# Patient Record
Sex: FEMALE | Race: WHITE | Employment: UNEMPLOYED | URBAN - METROPOLITAN AREA
[De-identification: names, ages, dates, MRNs, and addresses within clinical notes are randomized per-mention and may not be internally consistent; named-entity substitution may affect disease eponyms.]

---

## 2017-09-29 ENCOUNTER — HOSPITAL ENCOUNTER (OUTPATIENT)
Dept: GENERAL RADIOLOGY | Facility: HOSPITAL | Age: 37
Discharge: HOME | End: 2017-09-29
Attending: ORTHOPAEDIC SURGERY
Payer: COMMERCIAL

## 2017-09-29 DIAGNOSIS — M25.551 RIGHT HIP PAIN: ICD-10-CM

## 2017-09-29 DIAGNOSIS — Z98.890 HISTORY OF ORTHOPEDIC SURGERY: ICD-10-CM

## 2017-09-29 PROCEDURE — 73502 X-RAY EXAM HIP UNI 2-3 VIEWS: CPT | Performed by: ORTHOPAEDIC SURGERY

## 2018-08-16 PROBLEM — O34.40 HX OF LEEP (LOOP ELECTROSURGICAL EXCISION PROCEDURE) OF CERVIX COMPLICATING PREGNANCY (HCC): Status: ACTIVE | Noted: 2018-08-16

## 2018-08-16 PROBLEM — O09.811 PREGNANCY RESULTING FROM ASSISTED REPRODUCTIVE TECHNOLOGY IN FIRST TRIMESTER (HCC): Status: ACTIVE | Noted: 2018-08-16

## 2018-08-16 PROBLEM — Z98.890 HX OF LEEP (LOOP ELECTROSURGICAL EXCISION PROCEDURE) OF CERVIX COMPLICATING PREGNANCY: Status: ACTIVE | Noted: 2018-08-16

## 2018-08-16 PROBLEM — O09.519 AMA (ADVANCED MATERNAL AGE) PRIMIGRAVIDA 35+: Status: ACTIVE | Noted: 2018-08-16

## 2018-08-16 PROBLEM — Z98.890 HX OF LEEP (LOOP ELECTROSURGICAL EXCISION PROCEDURE) OF CERVIX COMPLICATING PREGNANCY (HCC): Status: ACTIVE | Noted: 2018-08-16

## 2018-08-16 PROBLEM — O34.40 HX OF LEEP (LOOP ELECTROSURGICAL EXCISION PROCEDURE) OF CERVIX COMPLICATING PREGNANCY: Status: ACTIVE | Noted: 2018-08-16

## 2018-08-16 PROBLEM — O09.519 AMA (ADVANCED MATERNAL AGE) PRIMIGRAVIDA 35+ (HCC): Status: ACTIVE | Noted: 2018-08-16

## 2018-08-16 PROBLEM — O09.811 PREGNANCY RESULTING FROM ASSISTED REPRODUCTIVE TECHNOLOGY IN FIRST TRIMESTER: Status: ACTIVE | Noted: 2018-08-16

## 2018-08-16 PROCEDURE — 86762 RUBELLA ANTIBODY: CPT | Performed by: OBSTETRICS & GYNECOLOGY

## 2018-08-16 PROCEDURE — 86850 RBC ANTIBODY SCREEN: CPT | Performed by: OBSTETRICS & GYNECOLOGY

## 2018-08-16 PROCEDURE — 87389 HIV-1 AG W/HIV-1&-2 AB AG IA: CPT | Performed by: OBSTETRICS & GYNECOLOGY

## 2018-08-16 PROCEDURE — 87086 URINE CULTURE/COLONY COUNT: CPT | Performed by: OBSTETRICS & GYNECOLOGY

## 2018-08-16 PROCEDURE — 87340 HEPATITIS B SURFACE AG IA: CPT | Performed by: OBSTETRICS & GYNECOLOGY

## 2018-08-16 PROCEDURE — 86901 BLOOD TYPING SEROLOGIC RH(D): CPT | Performed by: OBSTETRICS & GYNECOLOGY

## 2018-08-16 PROCEDURE — 86780 TREPONEMA PALLIDUM: CPT | Performed by: OBSTETRICS & GYNECOLOGY

## 2018-08-16 PROCEDURE — 86900 BLOOD TYPING SEROLOGIC ABO: CPT | Performed by: OBSTETRICS & GYNECOLOGY

## 2018-09-13 PROCEDURE — 88175 CYTOPATH C/V AUTO FLUID REDO: CPT | Performed by: OBSTETRICS & GYNECOLOGY

## 2018-10-11 PROCEDURE — 82105 ALPHA-FETOPROTEIN SERUM: CPT | Performed by: OBSTETRICS & GYNECOLOGY

## 2018-10-11 PROCEDURE — 36415 COLL VENOUS BLD VENIPUNCTURE: CPT | Performed by: OBSTETRICS & GYNECOLOGY

## 2018-12-05 PROBLEM — O09.519 SUPERVISION OF HIGH-RISK PREGNANCY OF ELDERLY PRIMIGRAVIDA: Status: ACTIVE | Noted: 2018-12-05

## 2018-12-05 PROBLEM — O09.519 SUPERVISION OF HIGH-RISK PREGNANCY OF ELDERLY PRIMIGRAVIDA (HCC): Status: ACTIVE | Noted: 2018-12-05

## 2018-12-18 PROCEDURE — 86780 TREPONEMA PALLIDUM: CPT | Performed by: OBSTETRICS & GYNECOLOGY

## 2018-12-18 PROCEDURE — 87389 HIV-1 AG W/HIV-1&-2 AB AG IA: CPT | Performed by: OBSTETRICS & GYNECOLOGY

## 2019-02-04 PROBLEM — O09.819 PREGNANCY RESULTING FROM ASSISTED REPRODUCTIVE TECHNOLOGY, ANTEPARTUM (HCC): Status: ACTIVE | Noted: 2018-08-16

## 2019-02-04 PROBLEM — O09.819 PREGNANCY RESULTING FROM ASSISTED REPRODUCTIVE TECHNOLOGY, ANTEPARTUM: Status: ACTIVE | Noted: 2018-08-16

## 2019-03-22 ENCOUNTER — TELEPHONE (OUTPATIENT)
Dept: OBGYN UNIT | Facility: HOSPITAL | Age: 39
End: 2019-03-22

## 2019-03-24 ENCOUNTER — APPOINTMENT (OUTPATIENT)
Dept: OBGYN CLINIC | Facility: HOSPITAL | Age: 39
End: 2019-03-24
Attending: OBSTETRICS & GYNECOLOGY
Payer: COMMERCIAL

## 2019-03-24 ENCOUNTER — HOSPITAL ENCOUNTER (INPATIENT)
Facility: HOSPITAL | Age: 39
LOS: 4 days | Discharge: HOME | End: 2019-03-28
Attending: OBSTETRICS & GYNECOLOGY | Admitting: OBSTETRICS & GYNECOLOGY
Payer: COMMERCIAL

## 2019-03-24 PROBLEM — Z34.90 PREGNANCY (HCC): Status: ACTIVE | Noted: 2019-03-24

## 2019-03-24 PROBLEM — Z34.90 PREGNANCY: Status: ACTIVE | Noted: 2019-03-24

## 2019-03-24 PROCEDURE — 86850 RBC ANTIBODY SCREEN: CPT | Performed by: OBSTETRICS & GYNECOLOGY

## 2019-03-24 PROCEDURE — 85027 COMPLETE CBC AUTOMATED: CPT | Performed by: OBSTETRICS & GYNECOLOGY

## 2019-03-24 PROCEDURE — 59200 INSERT CERVICAL DILATOR: CPT

## 2019-03-24 PROCEDURE — 86900 BLOOD TYPING SEROLOGIC ABO: CPT | Performed by: OBSTETRICS & GYNECOLOGY

## 2019-03-24 PROCEDURE — 86901 BLOOD TYPING SEROLOGIC RH(D): CPT | Performed by: OBSTETRICS & GYNECOLOGY

## 2019-03-24 RX ORDER — IBUPROFEN 600 MG/1
600 TABLET ORAL ONCE AS NEEDED
Status: DISCONTINUED | OUTPATIENT
Start: 2019-03-24 | End: 2019-03-26 | Stop reason: HOSPADM

## 2019-03-24 RX ORDER — SODIUM CHLORIDE 0.9 % (FLUSH) 0.9 %
10 SYRINGE (ML) INJECTION AS NEEDED
Status: DISCONTINUED | OUTPATIENT
Start: 2019-03-24 | End: 2019-03-26 | Stop reason: HOSPADM

## 2019-03-24 RX ORDER — NALBUPHINE HCL 10 MG/ML
10 AMPUL (ML) INJECTION EVERY 4 HOURS PRN
Status: DISCONTINUED | OUTPATIENT
Start: 2019-03-24 | End: 2019-03-28

## 2019-03-24 RX ORDER — LIDOCAINE HYDROCHLORIDE 10 MG/ML
30 INJECTION, SOLUTION EPIDURAL; INFILTRATION; INTRACAUDAL; PERINEURAL ONCE
Status: DISCONTINUED | OUTPATIENT
Start: 2019-03-24 | End: 2019-03-26 | Stop reason: HOSPADM

## 2019-03-24 RX ORDER — DIPHENHYDRAMINE HYDROCHLORIDE 50 MG/ML
50 INJECTION INTRAMUSCULAR; INTRAVENOUS NIGHTLY PRN
Status: DISCONTINUED | OUTPATIENT
Start: 2019-03-24 | End: 2019-03-28

## 2019-03-24 RX ORDER — TRISODIUM CITRATE DIHYDRATE AND CITRIC ACID MONOHYDRATE 500; 334 MG/5ML; MG/5ML
30 SOLUTION ORAL AS NEEDED
Status: DISCONTINUED | OUTPATIENT
Start: 2019-03-24 | End: 2019-03-26 | Stop reason: HOSPADM

## 2019-03-24 RX ORDER — AMMONIA INHALANTS 0.04 G/.3ML
0.3 INHALANT RESPIRATORY (INHALATION) AS NEEDED
Status: DISCONTINUED | OUTPATIENT
Start: 2019-03-24 | End: 2019-03-26 | Stop reason: HOSPADM

## 2019-03-24 RX ORDER — ONDANSETRON 2 MG/ML
4 INJECTION INTRAMUSCULAR; INTRAVENOUS EVERY 6 HOURS PRN
Status: DISCONTINUED | OUTPATIENT
Start: 2019-03-24 | End: 2019-03-26 | Stop reason: HOSPADM

## 2019-03-24 RX ORDER — DEXTROSE, SODIUM CHLORIDE, SODIUM LACTATE, POTASSIUM CHLORIDE, AND CALCIUM CHLORIDE 5; .6; .31; .03; .02 G/100ML; G/100ML; G/100ML; G/100ML; G/100ML
INJECTION, SOLUTION INTRAVENOUS CONTINUOUS
Status: DISCONTINUED | OUTPATIENT
Start: 2019-03-24 | End: 2019-03-26 | Stop reason: HOSPADM

## 2019-03-24 RX ORDER — HYDROXYZINE HYDROCHLORIDE 50 MG/ML
50 INJECTION, SOLUTION INTRAMUSCULAR EVERY 4 HOURS PRN
Status: DISCONTINUED | OUTPATIENT
Start: 2019-03-24 | End: 2019-03-28

## 2019-03-24 RX ORDER — TERBUTALINE SULFATE 1 MG/ML
0.25 INJECTION, SOLUTION SUBCUTANEOUS AS NEEDED
Status: DISCONTINUED | OUTPATIENT
Start: 2019-03-24 | End: 2019-03-26 | Stop reason: HOSPADM

## 2019-03-25 ENCOUNTER — ANESTHESIA EVENT (OUTPATIENT)
Dept: OBGYN UNIT | Facility: HOSPITAL | Age: 39
End: 2019-03-25
Payer: COMMERCIAL

## 2019-03-25 ENCOUNTER — ANESTHESIA (OUTPATIENT)
Dept: OBGYN UNIT | Facility: HOSPITAL | Age: 39
End: 2019-03-25
Payer: COMMERCIAL

## 2019-03-25 PROCEDURE — 3E033VJ INTRODUCTION OF OTHER HORMONE INTO PERIPHERAL VEIN, PERCUTANEOUS APPROACH: ICD-10-PCS | Performed by: OBSTETRICS & GYNECOLOGY

## 2019-03-25 PROCEDURE — 10H07YZ INSERTION OF OTHER DEVICE INTO PRODUCTS OF CONCEPTION, VIA NATURAL OR ARTIFICIAL OPENING: ICD-10-PCS | Performed by: OBSTETRICS & GYNECOLOGY

## 2019-03-25 RX ORDER — BUPIVACAINE HYDROCHLORIDE 2.5 MG/ML
10 INJECTION, SOLUTION EPIDURAL; INFILTRATION; INTRACAUDAL ONCE
Status: COMPLETED | OUTPATIENT
Start: 2019-03-25 | End: 2019-03-25

## 2019-03-25 RX ORDER — SODIUM CHLORIDE, SODIUM LACTATE, POTASSIUM CHLORIDE, CALCIUM CHLORIDE 600; 310; 30; 20 MG/100ML; MG/100ML; MG/100ML; MG/100ML
INJECTION, SOLUTION INTRAVENOUS
Status: COMPLETED
Start: 2019-03-25 | End: 2019-03-25

## 2019-03-25 RX ORDER — NALBUPHINE HCL 10 MG/ML
2.5 AMPUL (ML) INJECTION
Status: DISCONTINUED | OUTPATIENT
Start: 2019-03-25 | End: 2019-03-28

## 2019-03-25 RX ORDER — DIPHENHYDRAMINE HYDROCHLORIDE 50 MG/ML
25 INJECTION INTRAMUSCULAR; INTRAVENOUS ONCE
Status: COMPLETED | OUTPATIENT
Start: 2019-03-25 | End: 2019-03-25

## 2019-03-25 RX ORDER — BUPIVACAINE HYDROCHLORIDE 2.5 MG/ML
INJECTION, SOLUTION EPIDURAL; INFILTRATION; INTRACAUDAL
Status: DISPENSED
Start: 2019-03-25 | End: 2019-03-26

## 2019-03-25 RX ORDER — LIDOCAINE HYDROCHLORIDE AND EPINEPHRINE 20; 5 MG/ML; UG/ML
20 INJECTION, SOLUTION EPIDURAL; INFILTRATION; INTRACAUDAL; PERINEURAL ONCE
Status: COMPLETED | OUTPATIENT
Start: 2019-03-25 | End: 2019-03-25

## 2019-03-25 RX ORDER — DEXTROSE, SODIUM CHLORIDE, SODIUM LACTATE, POTASSIUM CHLORIDE, AND CALCIUM CHLORIDE 5; .6; .31; .03; .02 G/100ML; G/100ML; G/100ML; G/100ML; G/100ML
INJECTION, SOLUTION INTRAVENOUS
Status: COMPLETED
Start: 2019-03-25 | End: 2019-03-25

## 2019-03-25 RX ORDER — EPHEDRINE SULFATE/0.9% NACL/PF 25 MG/5 ML
5 SYRINGE (ML) INTRAVENOUS AS NEEDED
Status: DISCONTINUED | OUTPATIENT
Start: 2019-03-25 | End: 2019-03-28

## 2019-03-25 RX ADMIN — BUPIVACAINE HYDROCHLORIDE 10 ML: 2.5 INJECTION, SOLUTION EPIDURAL; INFILTRATION; INTRACAUDAL at 14:19:00

## 2019-03-25 NOTE — ANESTHESIA PREPROCEDURE EVALUATION
Anesthesia PreOp Note    HPI:     Polina Pablo is a 34year old female who presents for preoperative consultation requested by: * No surgeons listed *    Date of Surgery: 3/25/2019    * No procedures listed *  Indication: * No pre-op diagnosis entered mcg 100 mcg Epidural Once Tyrel Everett MD     EPHEDrine sulfate in NaCl 0.9% (PF) injection 5 mg 5 mg Intravenous PRN yTrel Everett MD     Nalbuphine HCl (NUBAIN) injection 2.5 mg 2.5 mg Intravenous Q15 Min PRN Tyrel Everett MD     lidocaine-EPINEPHrin Vishal Sage MD 50 mg at 03/24/19 2130      No current [facility]-ordered outpatient medications on file.       Cat Hair Extract        HIVES    Family History   Problem Relation Age of Onset   • Hypertension Father    • Hypertension Mother    • Diabetes Mate 03/24/2019    HGB 12.7 03/24/2019    HCT 36.9 03/24/2019    MCV 95.8 03/24/2019    MCH 33.0 03/24/2019    MCHC 34.4 03/24/2019    RDW 12.8 03/24/2019    .0 03/24/2019             Vital Signs: Body mass index is 27.22 kg/m².    height is 1.727 m (5'

## 2019-03-25 NOTE — PROGRESS NOTES
[facility]    Labor Progress Note    Jeni Romero Patient Status:  Inpatient    1980 MRN [id number]   Location [address]  Attending Rod Cope MD   Hosp Day # 1 PCP No primary care provider on f

## 2019-03-25 NOTE — ANESTHESIA PROCEDURE NOTES
Labor Analgesia  Performed by: Bari Nichols MD  Authorized by: Bari Nichols MD     Patient Location:  OB  Start Time:  3/25/2019 2:14 PM  End Time:  3/25/2019 2:16 PM  Reason for Block: labor epidural    Anesthesiologist:  Bari Nichols MD

## 2019-03-25 NOTE — PROGRESS NOTES
Pt is a 34year old female admitted to 377/377-A. Patient presents with:  Scheduled Induction: IVF      Pt is  40w1d intra-uterine pregnancy. History obtained, consents signed. Oriented to room, staff, and plan of care.

## 2019-03-25 NOTE — PROGRESS NOTES
[facility]    Labor Progress Note    Landonjeniffer Self Patient Status:  Inpatient    1980 MRN [id number]   Location [address]  Attending Fer Day MD   Hosp Day # 1 PCP No primary care provider on f

## 2019-03-25 NOTE — H&P
[facility] [employer] Patient Status:  Inpatient    1980 MRN [id number]   Location [address] [place] [address] Attending Andree Wilburn MD   Hosp Day # 0 PCP No primary care provider on fi Multivit-Min-Fe-FA (PRE- OR) Take by mouth.  Disp:  Rfl:  3/24/2019 at 0800     Allergies:   Cat Hair Extract        HIVES    OBJECTIVE:    Pulse:  [88-96] 88  BP: (133-143)/(84-88) 133/84    Lungs:   clear to auscultation bilaterally   Heart:   regula

## 2019-03-26 PROCEDURE — 0HQ9XZZ REPAIR PERINEUM SKIN, EXTERNAL APPROACH: ICD-10-PCS | Performed by: OBSTETRICS & GYNECOLOGY

## 2019-03-26 RX ORDER — ONDANSETRON 2 MG/ML
4 INJECTION INTRAMUSCULAR; INTRAVENOUS EVERY 6 HOURS PRN
Status: DISCONTINUED | OUTPATIENT
Start: 2019-03-26 | End: 2019-03-28

## 2019-03-26 RX ORDER — IBUPROFEN 200 MG
400 TABLET ORAL EVERY 4 HOURS PRN
Status: DISCONTINUED | OUTPATIENT
Start: 2019-03-26 | End: 2019-03-28

## 2019-03-26 RX ORDER — IBUPROFEN 600 MG/1
600 TABLET ORAL EVERY 4 HOURS PRN
Status: DISCONTINUED | OUTPATIENT
Start: 2019-03-26 | End: 2019-03-28

## 2019-03-26 RX ORDER — AMMONIA INHALANTS 0.04 G/.3ML
0.3 INHALANT RESPIRATORY (INHALATION) AS NEEDED
Status: DISCONTINUED | OUTPATIENT
Start: 2019-03-26 | End: 2019-03-28

## 2019-03-26 RX ORDER — IBUPROFEN 600 MG/1
600 TABLET ORAL EVERY 6 HOURS
Status: DISCONTINUED | OUTPATIENT
Start: 2019-03-26 | End: 2019-03-28

## 2019-03-26 RX ORDER — PRENATAL VIT,CAL 76/IRON/FOLIC 29 MG-1 MG
1 TABLET ORAL DAILY
Status: DISCONTINUED | OUTPATIENT
Start: 2019-03-26 | End: 2019-03-28

## 2019-03-26 RX ORDER — METHYLERGONOVINE MALEATE 0.2 MG/ML
INJECTION INTRAVENOUS
Status: COMPLETED
Start: 2019-03-26 | End: 2019-03-26

## 2019-03-26 RX ORDER — SODIUM CHLORIDE 0.9 % (FLUSH) 0.9 %
10 SYRINGE (ML) INJECTION AS NEEDED
Status: DISCONTINUED | OUTPATIENT
Start: 2019-03-26 | End: 2019-03-28

## 2019-03-26 RX ORDER — SIMETHICONE 80 MG
80 TABLET,CHEWABLE ORAL 3 TIMES DAILY PRN
Status: DISCONTINUED | OUTPATIENT
Start: 2019-03-26 | End: 2019-03-28

## 2019-03-26 RX ORDER — DOCUSATE SODIUM 100 MG/1
100 CAPSULE, LIQUID FILLED ORAL 2 TIMES DAILY
Status: DISCONTINUED | OUTPATIENT
Start: 2019-03-26 | End: 2019-03-28

## 2019-03-26 RX ORDER — MISOPROSTOL 200 UG/1
TABLET ORAL
Status: COMPLETED
Start: 2019-03-26 | End: 2019-03-26

## 2019-03-26 RX ORDER — BISACODYL 10 MG
10 SUPPOSITORY, RECTAL RECTAL ONCE AS NEEDED
Status: DISCONTINUED | OUTPATIENT
Start: 2019-03-26 | End: 2019-03-28

## 2019-03-26 RX ORDER — DIAPER,BRIEF,INFANT-TODD,DISP
1 EACH MISCELLANEOUS EVERY 6 HOURS PRN
Status: DISCONTINUED | OUTPATIENT
Start: 2019-03-26 | End: 2019-03-28

## 2019-03-26 RX ORDER — IBUPROFEN 200 MG
200 TABLET ORAL EVERY 4 HOURS PRN
Status: DISCONTINUED | OUTPATIENT
Start: 2019-03-26 | End: 2019-03-28

## 2019-03-26 NOTE — PROGRESS NOTES
Report received from Cadence Clancy rn. Received patient resting in bed no complaints. Psych/Behavioral

## 2019-03-26 NOTE — PROGRESS NOTES
Patient up to bathroom with assist x 2. Voided small amount at this time. Bladder scan volume 65 ml Patient transferred to mother/baby room 353 per wheelchair in stable condition with baby and personal belongings.   Accompanied by significant other and sta

## 2019-03-26 NOTE — PROGRESS NOTES
[facility]    Labor Progress Note    Steve Sharma Patient Status:  Inpatient    1980 MRN [id number]   Location [address] Attending Renetta Kang MD   Hosp Day # 1 PCP No primary care provider on f

## 2019-03-26 NOTE — PLAN OF CARE
ANXIETY    • Will report anxiety at manageable levels Progressing        PAIN - ADULT    • Verbalizes/displays adequate comfort level or patient's stated pain goal Progressing        Patient Centered Care    • Patient preferences are identified and Willy Gray

## 2019-03-26 NOTE — LACTATION NOTE
LACTATION NOTE - MOTHER      Evaluation Type: Inpatient    Problems identified  Problems identified: Knowledge deficit  Problems Identified Other: (HSV)         Breastfeeding goal  Breastfeeding goal: To maintain breast milk feeding per patient goal    Mat

## 2019-03-26 NOTE — L&D DELIVERY NOTE
[facility] - [facility]    Vaginal Delivery Note    Yony Gomez Patient Status:  Inpatient    1980 MRN [id number]   Location [address]  Attending Pasquale Rae MD   Hosp Day # 2 PCP No primary care provider on

## 2019-03-26 NOTE — PROGRESS NOTES
[facility]    Labor Progress Note    Iliana Chandler Patient Status:  Inpatient    1980 MRN [id number]   Location [address] [facility] Attending Deepa Ng MD   Hosp Day # 1 PCP No primary care provider on f

## 2019-03-26 NOTE — ANESTHESIA POSTPROCEDURE EVALUATION
Patient: Meryle Hodgkin    Procedure Summary     Date:  03/25/19 Room / Location:      Anesthesia Start:  1414 Anesthesia Stop:      Procedure:  LABOR ANALGESIA Diagnosis:      Scheduled Providers:   Anesthesiologist:  Dylan Zaragoza MD    Anesthesia Typ

## 2019-03-27 PROBLEM — O34.40 HX OF LEEP (LOOP ELECTROSURGICAL EXCISION PROCEDURE) OF CERVIX COMPLICATING PREGNANCY: Status: RESOLVED | Noted: 2018-08-16 | Resolved: 2019-03-26

## 2019-03-27 PROBLEM — O34.40 HX OF LEEP (LOOP ELECTROSURGICAL EXCISION PROCEDURE) OF CERVIX COMPLICATING PREGNANCY (HCC): Status: RESOLVED | Noted: 2018-08-16 | Resolved: 2019-03-26

## 2019-03-27 PROBLEM — Z98.890 HX OF LEEP (LOOP ELECTROSURGICAL EXCISION PROCEDURE) OF CERVIX COMPLICATING PREGNANCY (HCC): Status: RESOLVED | Noted: 2018-08-16 | Resolved: 2019-03-26

## 2019-03-27 PROBLEM — O09.819 PREGNANCY RESULTING FROM ASSISTED REPRODUCTIVE TECHNOLOGY, ANTEPARTUM (HCC): Status: RESOLVED | Noted: 2018-08-16 | Resolved: 2019-03-26

## 2019-03-27 PROBLEM — O09.519 SUPERVISION OF HIGH-RISK PREGNANCY OF ELDERLY PRIMIGRAVIDA (HCC): Status: RESOLVED | Noted: 2018-12-05 | Resolved: 2019-03-26

## 2019-03-27 PROBLEM — Z98.890 HX OF LEEP (LOOP ELECTROSURGICAL EXCISION PROCEDURE) OF CERVIX COMPLICATING PREGNANCY: Status: RESOLVED | Noted: 2018-08-16 | Resolved: 2019-03-26

## 2019-03-27 PROBLEM — O09.519 SUPERVISION OF HIGH-RISK PREGNANCY OF ELDERLY PRIMIGRAVIDA: Status: RESOLVED | Noted: 2018-12-05 | Resolved: 2019-03-26

## 2019-03-27 PROBLEM — O09.519 AMA (ADVANCED MATERNAL AGE) PRIMIGRAVIDA 35+: Status: RESOLVED | Noted: 2018-08-16 | Resolved: 2019-03-26

## 2019-03-27 PROBLEM — O09.819 PREGNANCY RESULTING FROM ASSISTED REPRODUCTIVE TECHNOLOGY, ANTEPARTUM: Status: RESOLVED | Noted: 2018-08-16 | Resolved: 2019-03-26

## 2019-03-27 PROBLEM — O09.519 AMA (ADVANCED MATERNAL AGE) PRIMIGRAVIDA 35+ (HCC): Status: RESOLVED | Noted: 2018-08-16 | Resolved: 2019-03-26

## 2019-03-27 PROCEDURE — 85025 COMPLETE CBC W/AUTO DIFF WBC: CPT | Performed by: OBSTETRICS & GYNECOLOGY

## 2019-03-27 NOTE — PROGRESS NOTES
[facility] - [facility]    OB/GYNE Progress Note      Berthaward Saad Patient Status:  Inpatient    1980 MRN [id number]   Location [facility] 3SE Attending Florentin Covarrubias MD   Hosp Day # 3 PCP No primary care provider on file.        A

## 2019-03-27 NOTE — PAYOR COMM NOTE
--------------  ADMISSION REVIEW     Payor: RAHUL CANTOR  Subscriber #:  [id number]  Authorization Number: [id number]    Admit date: 3/24/19  Admit time: [address]    History & Physical    Date of Admission:  3/24/2019    [address] [place] [address] labor.  Obstetrical history significant for advanced maternal age, IVF, Hx of LEEP. PLAN:    Risks, benefits, alternatives and possible complications have been discussed in detail with the patient.   Pre-admission, admission, and post admission procedure Delivery: spontaneous  Placenta to Pathology: no  Cord Gases Submitted: no  Cord Blood Collection: yes  Cord Tissue Collection: no  Cord Complications: none  Sponge and Needle Counts:  Verified yes     Maternal Anesthesia: epidural   Episiotomy/Laceratio

## 2019-03-28 VITALS
HEART RATE: 83 BPM | DIASTOLIC BLOOD PRESSURE: 57 MMHG | TEMPERATURE: 98 F | RESPIRATION RATE: 18 BRPM | WEIGHT: 179 LBS | BODY MASS INDEX: 27.13 KG/M2 | SYSTOLIC BLOOD PRESSURE: 113 MMHG | OXYGEN SATURATION: 96 % | HEIGHT: 68 IN

## 2019-03-28 PROBLEM — O09.529 AMA (ADVANCED MATERNAL AGE) MULTIGRAVIDA 35+ (HCC): Status: ACTIVE | Noted: 2019-03-28

## 2019-03-28 PROBLEM — O09.529 AMA (ADVANCED MATERNAL AGE) MULTIGRAVIDA 35+: Status: ACTIVE | Noted: 2019-03-28

## 2019-03-28 PROBLEM — N97.9 INFERTILITY, FEMALE: Status: ACTIVE | Noted: 2019-03-28

## 2019-03-28 RX ORDER — PSEUDOEPHEDRINE HCL 30 MG
100 TABLET ORAL 2 TIMES DAILY
Qty: 60 CAPSULE | Refills: 1 | Status: SHIPPED | OUTPATIENT
Start: 2019-03-28 | End: 2019-04-27

## 2019-03-28 RX ORDER — IBUPROFEN 600 MG/1
600 TABLET ORAL EVERY 6 HOURS
Qty: 60 TABLET | Refills: 1 | Status: SHIPPED | OUTPATIENT
Start: 2019-03-28

## 2019-03-28 RX ORDER — DIAPER,BRIEF,INFANT-TODD,DISP
1 EACH MISCELLANEOUS EVERY 6 HOURS PRN
Refills: 0 | Status: SHIPPED | COMMUNITY
Start: 2019-03-28

## 2019-03-28 NOTE — PLAN OF CARE
ANXIETY    • Will report anxiety at manageable levels Completed        GENITOURINARY - ADULT    • Absence of urinary retention Completed        PAIN - ADULT    • Verbalizes/displays adequate comfort level or patient's stated pain goal Completed        Aye

## 2019-03-28 NOTE — PLAN OF CARE
Problem: POSTPARTUM  Goal: Experiences normal breast weaning course  INTERVENTIONS:  - Assess for and manage engorgement. - Instruct on breast care. - Provide comfort measures.   Outcome: Completed Date Met: 03/28/19

## 2019-03-28 NOTE — DISCHARGE SUMMARY
[place] [facility] - [facility]    Discharge Summary    Karina Pablo Patient Status:  Inpatient    1980 MRN [id number]   Location [facility] 3SE Attending Александр Nolen MD   Hosp Day # 4 PCP No primary care provider on file.      Date of PT if not improved in postpartum period    Follow up: Follow-up [address] [facility]. Why:  As needed  Contact information:  [address]   [address] [place] [address]

## 2019-03-28 NOTE — LACTATION NOTE
LACTATION NOTE - MOTHER      Evaluation Type: Inpatient    Problems identified  Problems identified: Nipple pain;Knowledge deficit              Maternal Assessment  Bilateral Breasts: Soft  Bilateral Nipples: Colotrom easily expressed; Everted; Sore  Prior b

## 2019-03-28 NOTE — LACTATION NOTE
This note was copied from a baby's chart.   LACTATION NOTE - INFANT    Evaluation Type  Evaluation Type: Inpatient    Problems & Assessment  Problems Diagnosed or Identified: Shallow latch  Problems: comment/detail: infant sucking own tongue and lip  Muscle

## 2019-03-30 NOTE — PAYOR COMM NOTE
--------------  DISCHARGE REVIEW    Payor: RAHUL CANTOR  Subscriber #:  [id number]  Authorization Number: [id number]    Admit date: 3/24/19  Admit time:  1398  Discharge Date: 3/28/2019 12:44 PM     Admitting Physician: Jessica Glez MD  Attending [facility] 20-0.5 % External Aerosol  Apply 1 spray topically daily as needed (for perineal pain). docusate sodium 100 MG Oral Cap  Take 100 mg by mouth 2 (two) times daily.     hydrocortisone 1 % External Cream  Apply 1 Application topically every 6 (six) hours as

## 2019-04-11 ENCOUNTER — NURSE ONLY (OUTPATIENT)
Dept: LACTATION | Facility: HOSPITAL | Age: 39
End: 2019-04-11
Attending: OBSTETRICS & GYNECOLOGY
Payer: COMMERCIAL

## 2019-04-11 PROCEDURE — 99213 OFFICE O/P EST LOW 20 MIN: CPT

## 2019-04-11 NOTE — PATIENT INSTRUCTIONS
Infant Discharge Feeding Plan -      Please follow a residual pumping feeding plan and return on Saturday for a weight and latch check. Highly recommended infant be further evaluated by a skilled PT or body work provider.   Resource list provided ? Swallowing with most sucks (every 1-3 sucks) until satisfied at least 8-12 times every 24 hours. ? Compressing the breast when your baby sucks can increase milk flow. ? At least 6-8 wet diapers and at least 3-4 soft, yellow seedy stools every 24 hours. Call your infant’s healthcare provider sooner if your baby is not meeting the guidelines for feedings and diapers in your breastfeeding journal, if skin color or the whites of eyes become yellow, is very sleepy, very fussy or other concerns.      Follow up

## 2019-04-11 NOTE — PROGRESS NOTES
LACTATION NOTE - MOTHER      Evaluation Type: Outpatient Initial    Problems identified  Problems identified: Knowledge deficit;Milk supply not WNL; Nipple pain  Milk supply not WNL: Reduced (potential)    Maternal history  Maternal history: AMA; Infertility 8-12 breastfeeding sessions using cradle hold. She reports she was told to breastfeed for 10 minutes per breast and then provide 1-2 ounces of formula supplement during the 3-29-19 pediatric visit.  She had not been using her breastpump and didn't begin pum evaluations from professionals on the list provided. Discussed with mother the feeding plan: Follow a residual pumping feeding plan to protect her breastmilk supple until further infant oral evaluations are performed.  8-12 breastfeeding sessions per 24

## 2019-04-13 ENCOUNTER — NURSE ONLY (OUTPATIENT)
Dept: LACTATION | Facility: HOSPITAL | Age: 39
End: 2019-04-13
Payer: COMMERCIAL

## 2019-04-13 PROCEDURE — 99213 OFFICE O/P EST LOW 20 MIN: CPT

## 2019-04-13 NOTE — PROGRESS NOTES
LACTATION NOTE - MOTHER      Evaluation Type: Outpatient Initial    Problems identified  Problems identified: Knowledge deficit;Milk supply not WNL; Nipple pain  Milk supply not WNL: Reduced (potential)  Problems Identified Other: 7+/10 nipple soreness when ounces. Mother reports 24 hour intake of         ~ 11 Breastfeeding sessions or EBM provided per bottle post pumping.  She reported she  at on 4-12-19 at 0200, 0500 and 0800 and was so uncomfortable latching Cary that she pumped after that times/24 hours using the paced feeding method and obtain a follow up weight check at her pediatrician's office or at the Wednesday, mother baby support group at 10:30.      Once Donegary Bond is further evaluated and she and her  have decided on a treatmen

## 2019-04-13 NOTE — PATIENT INSTRUCTIONS
Infant Discharge Feeding Plan -      Snuggle your baby in skin to skin contact between and during feedings whenever possible. Massage your breasts before nursing or pumping to soften areola if needed.     Pump 10-12 times in 24 hours while getting 24–48 hours 5–15  48–72 hours 15–30  72–96 hours 30–60    Paced bottle feeding using a slow flow nipple:     ? Hold your baby in an upright position, supporting the hand and neck with your hand, rather than in the crook of your arm. ?  Let you baby “latch ? Express drops of breast milk on nipples before and after nursing (unless nipple thrush is present). ? Use a hydrogel type dressing on your nipples between feedings. (Soothies or Ameda ComfortGel pads)  ?  If too sore to nurse on one or both breasts, pump

## 2019-04-17 ENCOUNTER — TELEPHONE (OUTPATIENT)
Dept: LACTATION | Facility: HOSPITAL | Age: 39
End: 2019-04-17

## 2019-04-18 NOTE — TELEPHONE ENCOUNTER
Mother called to report infant gained weight over the weekend and is now 8lbs 10.5oz. Mother scheduled an appointment with LAI Madsen on 4/24 and with Dr. Bubba Talavera DDS on 4/29.  Encouraged to call lactation as needed with concerns or if s

## 2019-05-01 ENCOUNTER — NURSE ONLY (OUTPATIENT)
Dept: LACTATION | Facility: HOSPITAL | Age: 39
End: 2019-05-01
Attending: OBSTETRICS & GYNECOLOGY
Payer: COMMERCIAL

## 2019-05-01 DIAGNOSIS — O92.79 DISORDER OF LACTATION, POSTPARTUM CONDITION OR COMPLICATION: Primary | ICD-10-CM

## 2019-05-01 PROCEDURE — 99213 OFFICE O/P EST LOW 20 MIN: CPT

## 2019-05-01 NOTE — PROGRESS NOTES
Due to severe nipple pain with breastfeeding,  infant was previously evaluated for tongue/lip restrictions. Infant has been seen by Chery nEnis, SLP and is continuing to work on improvement of suck coordination.  Treatment for tongue and lip restricti

## 2019-05-01 NOTE — PATIENT INSTRUCTIONS
PUMPING  Continue with pumping 8x per day about every 2-3 hours. Sleep 4 hour stretches at night if possible. Reduce pumping sessions to 10-15 minutes (no more than 20)  Increase suction of pump as instructed by decreasing speed.    Use [facility]

## 2019-06-03 ENCOUNTER — NURSE ONLY (OUTPATIENT)
Dept: LACTATION | Facility: HOSPITAL | Age: 39
End: 2019-06-03
Attending: OBSTETRICS & GYNECOLOGY
Payer: COMMERCIAL

## 2019-06-03 DIAGNOSIS — O92.3 FAILURE OF LACTATION, POSTPARTUM CONDITION OR COMPLICATION: Primary | ICD-10-CM

## 2019-06-03 PROCEDURE — 99213 OFFICE O/P EST LOW 20 MIN: CPT

## 2019-06-03 NOTE — PATIENT INSTRUCTIONS
Infant Discharge Feeding Plan -      Snuggle your baby in skin to skin contact between and during feedings whenever possible. Massage your breasts before nursing or pumping to soften areola if needed.   Perform oral exercises \"prescribed\" by  · Use your expressed breast milk as the supplement or formula if breast milk is not to volume    Paced bottle feeding using a slow flow nipple:     ? Hold your baby in an upright position, supporting the hand and neck with your hand, rather than in the  Call if a plugged duct or engorgement persists greater than 48 hours or if firm or reddened spots are present in breast with signs of fever, chills or flu like symptoms (possible breast infection/mastitis). Check your temperature during engorgement.       C

## 2019-06-03 NOTE — PROGRESS NOTES
LACTATION NOTE - MOTHER      Evaluation Type: Outpatient Initial    Problems identified  Problems identified: Knowledge deficit;Milk supply not WNL; Nipple pain  Milk supply not WNL: Reduced (potential)  Problems Identified Other: Working on directly breast open wide )  Breastfeeding Positions: cradle;right breast;left breast(1350 - ~ 1414 on right; 1422 on left - fussy on & off - encouraging mom to burp and calm infant)  Latch: Grasps breast, tongue down, lips flanged, rhythmic sucking  Audible Sucks/Swallow feeding. Cary didn't push away on the right breast but I noted her thrusting her tongue and \"playing\" with the latch and then not latched deep enough.  [place] needed encouragement to [employer] and reminded to not let her compress her nipple w sessions per day in addition to 9-10  3 ounce feedings of EBM/formula, pump using breastpump after each session while family/ friend assists by using the paced feeding method (she reports using the Dr. Sarah Sole bottles and nipples per H.  Karen's recommenda

## 2019-08-10 ENCOUNTER — NURSE ONLY (OUTPATIENT)
Dept: LACTATION | Facility: HOSPITAL | Age: 39
End: 2019-08-10
Payer: COMMERCIAL

## 2019-08-10 DIAGNOSIS — O92.79 DISORDER OF LACTATION, POSTPARTUM CONDITION OR COMPLICATION: Primary | ICD-10-CM

## 2019-08-10 PROCEDURE — 99213 OFFICE O/P EST LOW 20 MIN: CPT

## 2019-08-10 NOTE — PATIENT INSTRUCTIONS
Goal is 25-30 ounces intake per day. Cary took 2.1 ounces at this feeding. You can rent a scale for a week if you want to do a 'Babymoon\" to make sure she's getting enough. Watch her diapers.   She should still have at least 6 saturated wet diapers

## 2019-08-10 NOTE — PROGRESS NOTES
LACTATION NOTE - MOTHER      Evaluation Type: Outpatient Follow Up    Problems identified  Problems identified: Knowledge deficit;Milk supply not WNL  Milk supply not WNL: Reduced (potential)  Problems Identified Other: mostly pumping, little time at breas

## 2021-12-17 ENCOUNTER — HOSPITAL ENCOUNTER (OUTPATIENT)
Dept: GENERAL RADIOLOGY | Facility: HOSPITAL | Age: 41
Discharge: HOME | End: 2021-12-17
Attending: ORTHOPAEDIC SURGERY
Payer: COMMERCIAL

## 2021-12-17 DIAGNOSIS — R52 PAIN: ICD-10-CM

## 2021-12-17 PROCEDURE — 73503 X-RAY EXAM HIP UNI 4/> VIEWS: CPT | Performed by: ORTHOPAEDIC SURGERY

## 2022-03-04 ENCOUNTER — TELEPHONE (OUTPATIENT)
Dept: OBGYN CLINIC | Facility: CLINIC | Age: 42
End: 2022-03-04

## 2022-03-04 NOTE — TELEPHONE ENCOUNTER
Patient calling to transfer care from IVF. Patient agrees to policy to rotate care between all providers in the office, and understands that on-call provider will deliver her. Patient directed to start PNV with iron, DHA and folic acid. Has already done son. OBN appt scheduled on 3/16/22 and NPN with [facility] 3/24/22 0800.

## 2022-03-04 NOTE — TELEPHONE ENCOUNTER
Pt is an IVF patient. Next week pt will be 7 weeks along. Pt needs to have appt within 3 weeks after 3/4 of release from IVF.     Please advise

## 2022-03-14 NOTE — TELEPHONE ENCOUNTER
Records received from [address] [place] [address], Dr. Andrew Negron. Placed in front office for review at OBN.

## 2022-03-16 ENCOUNTER — NURSE ONLY (OUTPATIENT)
Dept: OBGYN CLINIC | Facility: CLINIC | Age: 42
End: 2022-03-16
Payer: COMMERCIAL

## 2022-03-16 ENCOUNTER — TELEPHONE (OUTPATIENT)
Dept: OBGYN CLINIC | Facility: CLINIC | Age: 42
End: 2022-03-16

## 2022-03-16 VITALS — BODY MASS INDEX: 24 KG/M2 | WEIGHT: 155 LBS

## 2022-03-16 DIAGNOSIS — Z34.81 ENCOUNTER FOR SUPERVISION OF OTHER NORMAL PREGNANCY IN FIRST TRIMESTER: Primary | ICD-10-CM

## 2022-03-16 RX ORDER — PROGESTERONE 90 MG/1.125G
1 GEL VAGINAL 2 TIMES DAILY
COMMUNITY

## 2022-03-16 RX ORDER — ERGOCALCIFEROL 1.25 MG/1
CAPSULE ORAL WEEKLY
COMMUNITY

## 2022-03-16 RX ORDER — CHOLECALCIFEROL (VITAMIN D3) 25 MCG
1 TABLET,CHEWABLE ORAL DAILY
COMMUNITY

## 2022-03-16 NOTE — PROGRESS NOTES
Pt had OBN appt today with no complaints. Normal PN labs ordered plus 1 hr gtt. Pt advised all labs must be completed and resulted prior to MD appt. Pt Is scheduled for new ob appt with LINDSAY on 3/24 in [place]. Pt aware she needs labs done prior to NEW OB appt or appt will be cancelled. IVF pregnancy. Pt stated that she was told the embryo that was transferred is \"low level mosaic\". IVF records placed in [facility] appt folder slot in front [place] office. Partner's name is Mitzi Michael  contact # [phone] ; race:    Occupation: Stay at home mom     MEDICAL HISTORY    Anemia No    Anesthetic complications No    Anxiety/Depression  No    Autoimmune Disorder No    Asthma  Pt stated she had issues with asthma earlier in life. Used to be on albuterol inhaler. No issues for 15+ years. Cancer No    Diabetes  No    Gyne/breast Surgery Yes-LEEP in 2008. No abnormal paps since. IVF    3 hysteroscopies     Heart Disease No    Hepatitis/Liver Disease  No    History of blood transfusion Yes-after hip surgery in 2013. Pt stated that she was advised to bank her own blood before surgery just incase, so pts blood transfusion was with her own blood. History of abnormal pap Yes    Hypertension  No    Infertility  Yes    Kidney Disease/Frequent UTIs  No    Medication Allergies No    Latex Allergies No    Food Allergies  No    Neurological Disorder/Epilepsy No    Operations/Hospitalizations Yes- pt had BROWN right hip surgery in 2013 for hip dysplasia     . TB exposure  No    Thyroid Dysfunction No    Trauma/Violence  No    Uterine Anomaly  No    Uterine Fibroids  No    Variocosities/DVTs No    Smoker No    Drug usage in prior year No    Alcohol Socially prior to pregnancy     Would you accept a blood transfusion? If no, are you a Yarsani?  Pt stated she would accept blood products in event of emergency                 INFECTION HISTORY    Chlamydia No    Pt or partner have hx of Genital Herpes No    Gonorrhea No    Hepatitis B No    HIV No    HPV Yes    MRSA No    Syphilis No    Tattoos No    Live with someone or Exposed to TB No    Rash or viral illness since LMP  No    Varicella Yes    Recent Travel (or planned travel) to [facility] for self and or partner No    Pets Yes        GENETICS SCREENING    Genetic Screening    Genetic Screening/Teratology Counseling- Includes patient, baby's father, or anyone in either family with:  Patient's age 28 years or older as of estimated date of delivery: Yes   Thalassemia ([facility] [place], [address] [facility], or  background): MCV less than 80: No   Neural tube defect (Meningomyelocele, Spina bifida, or Anencephaly): No   Congenital heart defect: No   Down syndrome: No   Obed-Sachs (Ashkenazi Caodaism, [place], [place]): No   Canavan disease (Ashkenazi Caodaism): No   Familial dysautonomia (Ashkenazi Caodaism): No   Sickle cell disease or trait (): No   Hemophilia or other blood disorders: No   Muscular dystrophy: No    Cystic fibrosis: No   Herkimer's chorea: No   Intellectual disability and/or autism: No   Other inherited genetic or chromosomal disorder: No   Maternal metabolic disorder (eg. Type 1 diabetes, PKU):  No   Patient or baby's father had child with birth defects not listed above: No   Recurrent pregnancy loss, or a stillbirth: No   Medications (including supplements, vitamins, herbs, or OTC drugs)/illicit/recreational drugs/alcohol since last menstrual period: No               MISC    Infant vaccinations  Yes

## 2022-03-16 NOTE — TELEPHONE ENCOUNTER
Pt had OBN appt today as IVF transfer. 7w5d. Pt stated she has been taking 50,000 IU Of vitamin D weekly due to low vitamin D level. Pt asking if vitamin D can be checked with initial PN labs. Message to [address] (on call) if OK to add vitamin D?

## 2022-03-17 ENCOUNTER — LAB ENCOUNTER (OUTPATIENT)
Dept: LAB | Age: 42
End: 2022-03-17
Attending: OBSTETRICS & GYNECOLOGY
Payer: COMMERCIAL

## 2022-03-17 DIAGNOSIS — Z34.81 ENCOUNTER FOR SUPERVISION OF OTHER NORMAL PREGNANCY IN FIRST TRIMESTER: ICD-10-CM

## 2022-03-17 LAB
ANTIBODY SCREEN: NEGATIVE
BASOPHILS # BLD AUTO: 0.03 X10(3) UL (ref 0–0.2)
BASOPHILS NFR BLD AUTO: 0.4 %
DEPRECATED RDW RBC AUTO: 39.3 FL (ref 35.1–46.3)
EOSINOPHIL # BLD AUTO: 0.04 X10(3) UL (ref 0–0.7)
EOSINOPHIL NFR BLD AUTO: 0.5 %
ERYTHROCYTE [DISTWIDTH] IN BLOOD BY AUTOMATED COUNT: 11.9 % (ref 11–15)
GLUCOSE 1H P GLC SERPL-MCNC: 168 MG/DL
HCT VFR BLD AUTO: 39.3 %
HGB BLD-MCNC: 13.4 G/DL
IMM GRANULOCYTES # BLD AUTO: 0.03 X10(3) UL (ref 0–1)
IMM GRANULOCYTES NFR BLD: 0.4 %
LYMPHOCYTES # BLD AUTO: 2.46 X10(3) UL (ref 1–4)
LYMPHOCYTES NFR BLD AUTO: 30.4 %
MCH RBC QN AUTO: 31 PG (ref 26–34)
MCHC RBC AUTO-ENTMCNC: 34.1 G/DL (ref 31–37)
MCV RBC AUTO: 91 FL
MONOCYTES # BLD AUTO: 0.49 X10(3) UL (ref 0.1–1)
MONOCYTES NFR BLD AUTO: 6 %
NEUTROPHILS # BLD AUTO: 5.05 X10 (3) UL (ref 1.5–7.7)
NEUTROPHILS # BLD AUTO: 5.05 X10(3) UL (ref 1.5–7.7)
NEUTROPHILS NFR BLD AUTO: 62.3 %
PLATELET # BLD AUTO: 313 10(3)UL (ref 150–450)
RBC # BLD AUTO: 4.32 X10(6)UL
RH BLOOD TYPE: POSITIVE
RUBV IGG SER QL: POSITIVE
RUBV IGG SER-ACNC: 29.2 IU/ML (ref 10–?)
WBC # BLD AUTO: 8.1 X10(3) UL (ref 4–11)

## 2022-03-17 PROCEDURE — 87389 HIV-1 AG W/HIV-1&-2 AB AG IA: CPT

## 2022-03-17 PROCEDURE — 85025 COMPLETE CBC W/AUTO DIFF WBC: CPT

## 2022-03-17 PROCEDURE — 86762 RUBELLA ANTIBODY: CPT

## 2022-03-17 PROCEDURE — 86780 TREPONEMA PALLIDUM: CPT

## 2022-03-17 PROCEDURE — 86850 RBC ANTIBODY SCREEN: CPT

## 2022-03-17 PROCEDURE — 86901 BLOOD TYPING SEROLOGIC RH(D): CPT

## 2022-03-17 PROCEDURE — 82950 GLUCOSE TEST: CPT

## 2022-03-17 PROCEDURE — 87086 URINE CULTURE/COLONY COUNT: CPT

## 2022-03-17 PROCEDURE — 86900 BLOOD TYPING SEROLOGIC ABO: CPT

## 2022-03-17 PROCEDURE — 87340 HEPATITIS B SURFACE AG IA: CPT

## 2022-03-17 PROCEDURE — 36415 COLL VENOUS BLD VENIPUNCTURE: CPT

## 2022-03-18 ENCOUNTER — TELEPHONE (OUTPATIENT)
Dept: OBGYN CLINIC | Facility: CLINIC | Age: 42
End: 2022-03-18

## 2022-03-18 LAB — T PALLIDUM AB SER QL: NEGATIVE

## 2022-03-18 NOTE — TELEPHONE ENCOUNTER
Absolutely fine. Giver her a second dose in case she vomits first or needs another dose later in day. Thanks.

## 2022-03-18 NOTE — TELEPHONE ENCOUNTER
Patient notified of results. Directed to fast x 12 hours. Needs to schedule lab appt. Explain lab will consist of 4 blood draws. Patient states she will not be able to tolerate glucola on empty stomach. She has bad nausea affecting pregnancy that she keeps managed by eating small snacks early in the morning. Has not had vomiting, but frequently gags and swallows back vomit. She does not meet RN protocol for zofran given that she has not yet tried and failed the glucola, but she is certain she will not be able to tolerate it. To IZABELA- okay for zofran 4mg x 1 dose prior to 3 hr gtt?

## 2022-03-18 NOTE — TELEPHONE ENCOUNTER
----- Message from Satya Landin DO sent at 3/18/2022  4:03 PM CDT -----  Bassem Quintanilla, unfortunately you failed the glucose screen. This does not mean you are diabetic but we have to take the next test which is 3 hours with a total of 4 blood draws. This will be a fasting test.  I'll ask our Nurses to generate order and let you know.

## 2022-03-19 RX ORDER — ONDANSETRON 4 MG/1
4 TABLET, FILM COATED ORAL ONCE
Qty: 2 TABLET | Refills: 0 | Status: SHIPPED | OUTPATIENT
Start: 2022-03-19 | End: 2022-03-19

## 2022-03-22 ENCOUNTER — TELEPHONE (OUTPATIENT)
Dept: OBGYN CLINIC | Facility: CLINIC | Age: 42
End: 2022-03-22

## 2022-03-22 NOTE — TELEPHONE ENCOUNTER
Eulogio Oro called in she want a nurse to call her she have questions regarding the glucose test she is having tomorrow

## 2022-03-22 NOTE — TELEPHONE ENCOUNTER
Pt is schedule to do her 3 hr gtt tomorrow at 7:45am.  Pt knows she needs to fast for 12 hours prior to the test.  Pt states she takes Vit B6 and 1/2 a tab of unisom before bed to help with her nausea. Pt wondering if she is able to take that before bed tonight, which would be after 7:45pm.  Or if she should take it before 7:45pm tonight so that it is 12 hours before her test.  Message to Antolin Witt [phone] for recs.

## 2022-03-23 ENCOUNTER — LABORATORY ENCOUNTER (OUTPATIENT)
Dept: LAB | Age: 42
End: 2022-03-23
Attending: OBSTETRICS & GYNECOLOGY
Payer: COMMERCIAL

## 2022-03-23 DIAGNOSIS — R73.09 ELEVATED GLUCOSE TOLERANCE TEST: ICD-10-CM

## 2022-03-23 LAB
GLUCOSE 1H P GLC SERPL-MCNC: 146 MG/DL
GLUCOSE 2H P GLC SERPL-MCNC: 101 MG/DL
GLUCOSE 3H P GLC SERPL-MCNC: 91 MG/DL (ref 70–140)

## 2022-03-23 PROCEDURE — 82951 GLUCOSE TOLERANCE TEST (GTT): CPT

## 2022-03-23 PROCEDURE — 36415 COLL VENOUS BLD VENIPUNCTURE: CPT

## 2022-03-23 PROCEDURE — 82952 GTT-ADDED SAMPLES: CPT

## 2022-03-24 ENCOUNTER — TELEPHONE (OUTPATIENT)
Dept: OBGYN CLINIC | Facility: CLINIC | Age: 42
End: 2022-03-24

## 2022-03-24 ENCOUNTER — INITIAL PRENATAL (OUTPATIENT)
Dept: OBGYN CLINIC | Facility: CLINIC | Age: 42
End: 2022-03-24
Payer: COMMERCIAL

## 2022-03-24 VITALS — SYSTOLIC BLOOD PRESSURE: 112 MMHG | DIASTOLIC BLOOD PRESSURE: 78 MMHG | WEIGHT: 153.81 LBS | BODY MASS INDEX: 23 KG/M2

## 2022-03-24 DIAGNOSIS — Z31.83 IN VITRO FERTILIZATION: ICD-10-CM

## 2022-03-24 DIAGNOSIS — Z98.890 HISTORY OF LEEP (LOOP ELECTROSURGICAL EXCISION PROCEDURE) OF CERVIX COMPLICATING PREGNANCY IN FIRST TRIMESTER: Primary | ICD-10-CM

## 2022-03-24 DIAGNOSIS — O09.521 AMA (ADVANCED MATERNAL AGE) MULTIGRAVIDA 35+, FIRST TRIMESTER: ICD-10-CM

## 2022-03-24 DIAGNOSIS — O34.41 HISTORY OF LEEP (LOOP ELECTROSURGICAL EXCISION PROCEDURE) OF CERVIX COMPLICATING PREGNANCY IN FIRST TRIMESTER: Primary | ICD-10-CM

## 2022-03-24 DIAGNOSIS — Z34.80 ENCOUNTER FOR SUPERVISION OF OTHER NORMAL PREGNANCY, UNSPECIFIED TRIMESTER: Primary | ICD-10-CM

## 2022-03-24 PROBLEM — O34.40 HISTORY OF CERVICAL LEEP BIOPSY AFFECTING CARE OF MOTHER, ANTEPARTUM: Status: ACTIVE | Noted: 2022-03-24

## 2022-03-24 PROBLEM — O09.529 AMA (ADVANCED MATERNAL AGE) MULTIGRAVIDA 35+: Status: RESOLVED | Noted: 2019-03-28 | Resolved: 2022-03-24

## 2022-03-24 PROBLEM — O09.811 PREGNANCY RESULTING FROM IN VITRO FERTILIZATION IN FIRST TRIMESTER (HCC): Status: ACTIVE | Noted: 2022-03-24

## 2022-03-24 PROBLEM — O09.529 AMA (ADVANCED MATERNAL AGE) MULTIGRAVIDA 35+ (HCC): Status: RESOLVED | Noted: 2019-03-28 | Resolved: 2022-03-24

## 2022-03-24 PROBLEM — N97.9 INFERTILITY, FEMALE: Status: RESOLVED | Noted: 2019-03-28 | Resolved: 2022-03-24

## 2022-03-24 PROBLEM — Z34.90 PREGNANCY (HCC): Status: RESOLVED | Noted: 2019-03-24 | Resolved: 2022-03-24

## 2022-03-24 PROBLEM — Z34.90 PREGNANCY: Status: RESOLVED | Noted: 2019-03-24 | Resolved: 2022-03-24

## 2022-03-24 PROBLEM — O09.811 PREGNANCY RESULTING FROM IN VITRO FERTILIZATION IN FIRST TRIMESTER: Status: ACTIVE | Noted: 2022-03-24

## 2022-03-24 PROBLEM — O34.40 HISTORY OF CERVICAL LEEP BIOPSY AFFECTING CARE OF MOTHER, ANTEPARTUM (HCC): Status: ACTIVE | Noted: 2022-03-24

## 2022-03-24 LAB
BILIRUBIN: NEGATIVE
GLUCOSE (URINE DIPSTICK): NEGATIVE MG/DL
LEUKOCYTES: NEGATIVE
MULTISTIX EXPIRATION DATE: 0 DATE
MULTISTIX LOT#: 0 NUMERIC
MULTISTIX LOT#: NORMAL NUMERIC
NITRITE, URINE: NEGATIVE
OCCULT BLOOD: NEGATIVE
PH, URINE: 6.5 (ref 4.5–8)
SPECIFIC GRAVITY: 1.02 (ref 1–1.03)
URINE-COLOR: YELLOW
UROBILINOGEN,SEMI-QN: 0.2 MG/DL (ref 0–1.9)

## 2022-03-24 PROCEDURE — 3074F SYST BP LT 130 MM HG: CPT | Performed by: OBSTETRICS & GYNECOLOGY

## 2022-03-24 PROCEDURE — 81002 URINALYSIS NONAUTO W/O SCOPE: CPT | Performed by: OBSTETRICS & GYNECOLOGY

## 2022-03-24 PROCEDURE — 3078F DIAST BP <80 MM HG: CPT | Performed by: OBSTETRICS & GYNECOLOGY

## 2022-03-24 RX ORDER — DIPHENHYDRAMINE HYDROCHLORIDE 25 MG/1
25 CAPSULE ORAL DAILY
COMMUNITY

## 2022-03-24 NOTE — PROGRESS NOTES
U/a 2000+ glucose in error (different patient). Neg / neg pap 21 seen in care everywhere. Defer cultures given crinone bid usage. Needs done at next visit. Hx prior LEEP but full term  since.   Current preg FET w/ low level mosaic -- wishes to d/w MFM
prolonged rupture of membranes/premature rupture of membranes prior to labor

## 2022-03-24 NOTE — TELEPHONE ENCOUNTER
Wishes for FTS, level 2 ultrasound & consult w/ MFM about low level mosaic of FET done.  Also CECELIA

## 2022-03-28 ENCOUNTER — TELEPHONE (OUTPATIENT)
Dept: PERINATAL CARE | Facility: HOSPITAL | Age: 42
End: 2022-03-28

## 2022-04-15 ENCOUNTER — TELEPHONE (OUTPATIENT)
Dept: OBGYN CLINIC | Facility: CLINIC | Age: 42
End: 2022-04-15

## 2022-04-15 NOTE — TELEPHONE ENCOUNTER
12w0d Pt thinks she may have a yeast infection. She finished her crinone gel last Thursday. She has vag itching, a little odor and little white discharge. Not sure if some is from the crinone gel. Sent to [facility] [place], MD on Call.

## 2022-04-22 ENCOUNTER — LAB ENCOUNTER (OUTPATIENT)
Dept: LAB | Age: 42
End: 2022-04-22
Attending: OBSTETRICS & GYNECOLOGY
Payer: COMMERCIAL

## 2022-04-22 ENCOUNTER — ROUTINE PRENATAL (OUTPATIENT)
Dept: OBGYN CLINIC | Facility: CLINIC | Age: 42
End: 2022-04-22
Payer: COMMERCIAL

## 2022-04-22 VITALS
HEART RATE: 101 BPM | SYSTOLIC BLOOD PRESSURE: 113 MMHG | BODY MASS INDEX: 23 KG/M2 | WEIGHT: 154 LBS | DIASTOLIC BLOOD PRESSURE: 75 MMHG

## 2022-04-22 DIAGNOSIS — Z34.80 ENCOUNTER FOR SUPERVISION OF OTHER NORMAL PREGNANCY, UNSPECIFIED TRIMESTER: Primary | ICD-10-CM

## 2022-04-22 DIAGNOSIS — Z34.80 ENCOUNTER FOR SUPERVISION OF OTHER NORMAL PREGNANCY, UNSPECIFIED TRIMESTER: ICD-10-CM

## 2022-04-22 LAB
APPEARANCE: CLEAR
BILIRUBIN: NEGATIVE
GLUCOSE (URINE DIPSTICK): NEGATIVE MG/DL
KETONES (URINE DIPSTICK): NEGATIVE MG/DL
LEUKOCYTES: NEGATIVE
MULTISTIX LOT#: NORMAL NUMERIC
NITRITE, URINE: NEGATIVE
OCCULT BLOOD: NEGATIVE
PH, URINE: 7 (ref 4.5–8)
PROTEIN (URINE DIPSTICK): NEGATIVE MG/DL
SPECIFIC GRAVITY: 1.01 (ref 1–1.03)
URINE-COLOR: YELLOW
UROBILINOGEN,SEMI-QN: 0.2 MG/DL (ref 0–1.9)

## 2022-04-22 PROCEDURE — 3078F DIAST BP <80 MM HG: CPT | Performed by: OBSTETRICS & GYNECOLOGY

## 2022-04-22 PROCEDURE — 3074F SYST BP LT 130 MM HG: CPT | Performed by: OBSTETRICS & GYNECOLOGY

## 2022-04-22 PROCEDURE — 87591 N.GONORRHOEAE DNA AMP PROB: CPT

## 2022-04-22 PROCEDURE — 81002 URINALYSIS NONAUTO W/O SCOPE: CPT | Performed by: OBSTETRICS & GYNECOLOGY

## 2022-04-22 PROCEDURE — 87491 CHLMYD TRACH DNA AMP PROBE: CPT

## 2022-04-25 LAB
C TRACH DNA SPEC QL NAA+PROBE: NEGATIVE
N GONORRHOEA DNA SPEC QL NAA+PROBE: NEGATIVE

## 2022-04-26 ENCOUNTER — TELEPHONE (OUTPATIENT)
Dept: OBGYN CLINIC | Facility: CLINIC | Age: 42
End: 2022-04-26

## 2022-04-26 NOTE — TELEPHONE ENCOUNTER
Received letter from Romero Shah MD, dated 4/22/22. Sent to [facility].  It was placed on IZABELA's desk. MFM letter sent with attached note for my findings and recs.

## 2022-05-18 ENCOUNTER — ROUTINE PRENATAL (OUTPATIENT)
Dept: OBGYN CLINIC | Facility: CLINIC | Age: 42
End: 2022-05-18
Payer: COMMERCIAL

## 2022-05-18 ENCOUNTER — TELEPHONE (OUTPATIENT)
Dept: OBGYN CLINIC | Facility: CLINIC | Age: 42
End: 2022-05-18

## 2022-05-18 VITALS
SYSTOLIC BLOOD PRESSURE: 120 MMHG | DIASTOLIC BLOOD PRESSURE: 82 MMHG | WEIGHT: 156 LBS | HEART RATE: 82 BPM | BODY MASS INDEX: 24 KG/M2

## 2022-05-18 DIAGNOSIS — Z34.92 ENCOUNTER FOR SUPERVISION OF NORMAL PREGNANCY IN SECOND TRIMESTER, UNSPECIFIED GRAVIDITY: Primary | ICD-10-CM

## 2022-05-18 LAB
APPEARANCE: CLEAR
BILIRUBIN: NEGATIVE
GLUCOSE (URINE DIPSTICK): NEGATIVE MG/DL
KETONES (URINE DIPSTICK): NEGATIVE MG/DL
LEUKOCYTES: NEGATIVE
MULTISTIX LOT#: NORMAL NUMERIC
NITRITE, URINE: NEGATIVE
OCCULT BLOOD: NEGATIVE
PH, URINE: 5 (ref 4.5–8)
PROTEIN (URINE DIPSTICK): NEGATIVE MG/DL
SPECIFIC GRAVITY: 1.01 (ref 1–1.03)
URINE-COLOR: YELLOW
UROBILINOGEN,SEMI-QN: 0.2 MG/DL (ref 0–1.9)

## 2022-05-18 PROCEDURE — 81002 URINALYSIS NONAUTO W/O SCOPE: CPT | Performed by: OBSTETRICS & GYNECOLOGY

## 2022-05-18 PROCEDURE — 3079F DIAST BP 80-89 MM HG: CPT | Performed by: OBSTETRICS & GYNECOLOGY

## 2022-05-18 PROCEDURE — 3074F SYST BP LT 130 MM HG: CPT | Performed by: OBSTETRICS & GYNECOLOGY

## 2022-05-18 NOTE — TELEPHONE ENCOUNTER
Pt is 16w5d. Pts problem list states AMA, hx of LEEP, and IVF pregnancy. Message to EMB if you would see pt for a PN appt or no?

## 2022-05-18 NOTE — TELEPHONE ENCOUNTER
Pt returned call, offered appt on 6/16 with [facility] at 11 am. Pt can not accept appt that day due to childcare issues. All providers schedules including [facility]'s and not openings. Pt informed that message will be routed to supervisor for review. Pt states understanding. To Lucilla Boas RN supervisor to review and advise. Thank you.

## 2022-06-13 ENCOUNTER — ROUTINE PRENATAL (OUTPATIENT)
Dept: OBGYN CLINIC | Facility: CLINIC | Age: 42
End: 2022-06-13
Payer: COMMERCIAL

## 2022-06-13 VITALS
DIASTOLIC BLOOD PRESSURE: 72 MMHG | SYSTOLIC BLOOD PRESSURE: 107 MMHG | BODY MASS INDEX: 24 KG/M2 | HEART RATE: 80 BPM | WEIGHT: 159.38 LBS

## 2022-06-13 DIAGNOSIS — Z34.83 ENCOUNTER FOR SUPERVISION OF OTHER NORMAL PREGNANCY IN THIRD TRIMESTER: Primary | ICD-10-CM

## 2022-06-13 LAB
BILIRUBIN: NEGATIVE
GLUCOSE (URINE DIPSTICK): NEGATIVE MG/DL
KETONES (URINE DIPSTICK): NEGATIVE MG/DL
MULTISTIX EXPIRATION DATE: ABNORMAL DATE
MULTISTIX LOT#: ABNORMAL NUMERIC
NITRITE, URINE: NEGATIVE
OCCULT BLOOD: NEGATIVE
PH, URINE: 7 (ref 4.5–8)
PROTEIN (URINE DIPSTICK): NEGATIVE MG/DL
SPECIFIC GRAVITY: 1.01 (ref 1–1.03)
UROBILINOGEN,SEMI-QN: 0.2 MG/DL (ref 0–1.9)

## 2022-06-13 PROCEDURE — 3074F SYST BP LT 130 MM HG: CPT | Performed by: OBSTETRICS & GYNECOLOGY

## 2022-06-13 PROCEDURE — 81002 URINALYSIS NONAUTO W/O SCOPE: CPT | Performed by: OBSTETRICS & GYNECOLOGY

## 2022-06-13 PROCEDURE — 3078F DIAST BP <80 MM HG: CPT | Performed by: OBSTETRICS & GYNECOLOGY

## 2022-06-13 NOTE — PROGRESS NOTES
No issues. Had MFM scan in [facility] today. Report not currently available. 3/4 Amnio results back and 1 last pending. More frequent HAs. Hydrating well and tylenol only sometimes helps. Recs to see PCP to r/o allergies vs migraines. Has fetal echo scheduled. RTC 4 wks.

## 2022-07-13 ENCOUNTER — ROUTINE PRENATAL (OUTPATIENT)
Dept: OBGYN CLINIC | Facility: CLINIC | Age: 42
End: 2022-07-13
Payer: COMMERCIAL

## 2022-07-13 VITALS
SYSTOLIC BLOOD PRESSURE: 108 MMHG | WEIGHT: 165 LBS | DIASTOLIC BLOOD PRESSURE: 73 MMHG | BODY MASS INDEX: 25 KG/M2 | HEART RATE: 82 BPM

## 2022-07-13 DIAGNOSIS — Z34.82 ENCOUNTER FOR SUPERVISION OF OTHER NORMAL PREGNANCY IN SECOND TRIMESTER: Primary | ICD-10-CM

## 2022-07-13 LAB
APPEARANCE: CLEAR
GLUCOSE (URINE DIPSTICK): NEGATIVE MG/DL
KETONES (URINE DIPSTICK): NEGATIVE MG/DL
MULTISTIX LOT#: NORMAL NUMERIC
NITRITE, URINE: NEGATIVE
PROTEIN (URINE DIPSTICK): NEGATIVE MG/DL
URINE-COLOR: YELLOW

## 2022-07-13 PROCEDURE — 3078F DIAST BP <80 MM HG: CPT | Performed by: OBSTETRICS & GYNECOLOGY

## 2022-07-13 PROCEDURE — 3074F SYST BP LT 130 MM HG: CPT | Performed by: OBSTETRICS & GYNECOLOGY

## 2022-07-13 PROCEDURE — 81002 URINALYSIS NONAUTO W/O SCOPE: CPT | Performed by: OBSTETRICS & GYNECOLOGY

## 2022-07-13 NOTE — PROGRESS NOTES
Sprained L ankle last Friday- has some ROM limitations but declined xray at the time- encouraged pt to reconsider this , pt states she was told by [facility] that 2nd trimester 1 hour GTT coud be waived and she could just do the 3 hour GTT, amnio results finally arrived last week- normal chromosomes

## 2022-07-15 ENCOUNTER — TELEPHONE (OUTPATIENT)
Dept: OBGYN CLINIC | Facility: CLINIC | Age: 42
End: 2022-07-15

## 2022-07-15 NOTE — TELEPHONE ENCOUNTER
Visit notes dated 7/14/2022 recevied from [facility] and placed on [facility]'s desk for review.

## 2022-07-22 ENCOUNTER — TELEPHONE (OUTPATIENT)
Dept: OBGYN CLINIC | Facility: CLINIC | Age: 42
End: 2022-07-22

## 2022-07-22 DIAGNOSIS — Z34.92 ENCOUNTER FOR SUPERVISION OF NORMAL PREGNANCY IN SECOND TRIMESTER, UNSPECIFIED GRAVIDITY: Primary | ICD-10-CM

## 2022-07-22 NOTE — TELEPHONE ENCOUNTER
Pt asking for 3 hr gtt order. Informed pt CAP placed the order on 7/13. See 7/13 visit notes. Pt states she called CS and was informed there is no order for 3 hr gtt. Informed pt CAP's order looks a little different but it is the correct order. Informed pt nurse will reorder to read 3 hr gtt. Pt is appreciative.

## 2022-07-25 ENCOUNTER — PATIENT MESSAGE (OUTPATIENT)
Dept: OBGYN CLINIC | Facility: CLINIC | Age: 42
End: 2022-07-25

## 2022-07-27 ENCOUNTER — LABORATORY ENCOUNTER (OUTPATIENT)
Dept: LAB | Age: 42
End: 2022-07-27
Attending: OBSTETRICS & GYNECOLOGY
Payer: COMMERCIAL

## 2022-07-27 DIAGNOSIS — Z34.82 ENCOUNTER FOR SUPERVISION OF OTHER NORMAL PREGNANCY IN SECOND TRIMESTER: ICD-10-CM

## 2022-07-27 DIAGNOSIS — Z34.92 ENCOUNTER FOR SUPERVISION OF NORMAL PREGNANCY IN SECOND TRIMESTER, UNSPECIFIED GRAVIDITY: ICD-10-CM

## 2022-07-27 LAB
DEPRECATED RDW RBC AUTO: 46.2 FL (ref 35.1–46.3)
ERYTHROCYTE [DISTWIDTH] IN BLOOD BY AUTOMATED COUNT: 13.2 % (ref 11–15)
GLUCOSE 1H P GLC SERPL-MCNC: 140 MG/DL
GLUCOSE 2H P GLC SERPL-MCNC: 130 MG/DL
GLUCOSE 3H P GLC SERPL-MCNC: 140 MG/DL (ref 70–140)
GLUCOSE P FAST SERPL-MCNC: 75 MG/DL
HCT VFR BLD AUTO: 32.9 %
HGB BLD-MCNC: 11.1 G/DL
MCH RBC QN AUTO: 32.6 PG (ref 26–34)
MCHC RBC AUTO-ENTMCNC: 33.7 G/DL (ref 31–37)
MCV RBC AUTO: 96.8 FL
PLATELET # BLD AUTO: 302 10(3)UL (ref 150–450)
RBC # BLD AUTO: 3.4 X10(6)UL
WBC # BLD AUTO: 10.2 X10(3) UL (ref 4–11)

## 2022-07-27 PROCEDURE — 82951 GLUCOSE TOLERANCE TEST (GTT): CPT

## 2022-07-27 PROCEDURE — 36415 COLL VENOUS BLD VENIPUNCTURE: CPT

## 2022-07-27 PROCEDURE — 82952 GTT-ADDED SAMPLES: CPT

## 2022-07-27 PROCEDURE — 85027 COMPLETE CBC AUTOMATED: CPT

## 2022-08-08 ENCOUNTER — ROUTINE PRENATAL (OUTPATIENT)
Dept: OBGYN CLINIC | Facility: CLINIC | Age: 42
End: 2022-08-08
Payer: COMMERCIAL

## 2022-08-08 VITALS
DIASTOLIC BLOOD PRESSURE: 71 MMHG | HEART RATE: 87 BPM | SYSTOLIC BLOOD PRESSURE: 103 MMHG | BODY MASS INDEX: 25 KG/M2 | WEIGHT: 167.38 LBS

## 2022-08-08 DIAGNOSIS — Z34.82 ENCOUNTER FOR SUPERVISION OF OTHER NORMAL PREGNANCY IN SECOND TRIMESTER: Primary | ICD-10-CM

## 2022-08-08 PROBLEM — Z30.2 REQUEST FOR STERILIZATION: Status: ACTIVE | Noted: 2022-08-08

## 2022-08-08 LAB
BILIRUBIN: NEGATIVE
GLUCOSE (URINE DIPSTICK): NEGATIVE MG/DL
KETONES (URINE DIPSTICK): NEGATIVE MG/DL
LEUKOCYTES: NEGATIVE
MULTISTIX EXPIRATION DATE: 8822 DATE
MULTISTIX LOT#: 2030 NUMERIC
NITRITE, URINE: NEGATIVE
OCCULT BLOOD: NEGATIVE
PH, URINE: 6.5 (ref 4.5–8)
PROTEIN (URINE DIPSTICK): NEGATIVE MG/DL
SPECIFIC GRAVITY: 1.02 (ref 1–1.03)
UROBILINOGEN,SEMI-QN: 0.2 MG/DL (ref 0–1.9)

## 2022-08-08 PROCEDURE — 81002 URINALYSIS NONAUTO W/O SCOPE: CPT | Performed by: OBSTETRICS & GYNECOLOGY

## 2022-08-08 PROCEDURE — 3074F SYST BP LT 130 MM HG: CPT | Performed by: OBSTETRICS & GYNECOLOGY

## 2022-08-08 PROCEDURE — 3078F DIAST BP <80 MM HG: CPT | Performed by: OBSTETRICS & GYNECOLOGY

## 2022-08-09 NOTE — PROGRESS NOTES
No S/S of PTL. She inquired about health benefits of bilateral salpingectomy. She is at further risk due to many stimulated ovulation cycles with IVAN. She asks for salpingectomy if  delivery or PP if .

## 2022-08-19 ENCOUNTER — ROUTINE PRENATAL (OUTPATIENT)
Dept: OBGYN CLINIC | Facility: CLINIC | Age: 42
End: 2022-08-19
Payer: COMMERCIAL

## 2022-08-19 VITALS
DIASTOLIC BLOOD PRESSURE: 71 MMHG | WEIGHT: 170.19 LBS | HEART RATE: 94 BPM | BODY MASS INDEX: 26 KG/M2 | SYSTOLIC BLOOD PRESSURE: 108 MMHG

## 2022-08-19 DIAGNOSIS — Z34.93 ENCOUNTER FOR SUPERVISION OF NORMAL PREGNANCY IN THIRD TRIMESTER, UNSPECIFIED GRAVIDITY: Primary | ICD-10-CM

## 2022-08-19 LAB
BILIRUBIN: NEGATIVE
GLUCOSE (URINE DIPSTICK): NEGATIVE MG/DL
KETONES (URINE DIPSTICK): NEGATIVE MG/DL
MULTISTIX LOT#: ABNORMAL NUMERIC
NITRITE, URINE: NEGATIVE
OCCULT BLOOD: NEGATIVE
PH, URINE: 7.5 (ref 4.5–8)
PROTEIN (URINE DIPSTICK): NEGATIVE MG/DL
SPECIFIC GRAVITY: 1.01 (ref 1–1.03)
UROBILINOGEN,SEMI-QN: 0.2 MG/DL (ref 0–1.9)

## 2022-08-19 PROCEDURE — 3078F DIAST BP <80 MM HG: CPT | Performed by: OBSTETRICS & GYNECOLOGY

## 2022-08-19 PROCEDURE — 90471 IMMUNIZATION ADMIN: CPT | Performed by: OBSTETRICS & GYNECOLOGY

## 2022-08-19 PROCEDURE — 90715 TDAP VACCINE 7 YRS/> IM: CPT | Performed by: OBSTETRICS & GYNECOLOGY

## 2022-08-19 PROCEDURE — 3074F SYST BP LT 130 MM HG: CPT | Performed by: OBSTETRICS & GYNECOLOGY

## 2022-08-19 PROCEDURE — 81002 URINALYSIS NONAUTO W/O SCOPE: CPT | Performed by: OBSTETRICS & GYNECOLOGY

## 2022-09-07 ENCOUNTER — TELEPHONE (OUTPATIENT)
Dept: OBGYN CLINIC | Facility: CLINIC | Age: 42
End: 2022-09-07

## 2022-09-07 ENCOUNTER — ROUTINE PRENATAL (OUTPATIENT)
Dept: OBGYN CLINIC | Facility: CLINIC | Age: 42
End: 2022-09-07
Payer: COMMERCIAL

## 2022-09-07 VITALS
HEART RATE: 87 BPM | SYSTOLIC BLOOD PRESSURE: 114 MMHG | BODY MASS INDEX: 26 KG/M2 | DIASTOLIC BLOOD PRESSURE: 72 MMHG | WEIGHT: 172 LBS

## 2022-09-07 DIAGNOSIS — Z34.93 ENCOUNTER FOR SUPERVISION OF NORMAL PREGNANCY IN THIRD TRIMESTER, UNSPECIFIED GRAVIDITY: Primary | ICD-10-CM

## 2022-09-07 LAB
APPEARANCE: CLEAR
BILIRUBIN: NEGATIVE
GLUCOSE (URINE DIPSTICK): NEGATIVE MG/DL
KETONES (URINE DIPSTICK): NEGATIVE MG/DL
MULTISTIX LOT#: ABNORMAL NUMERIC
NITRITE, URINE: NEGATIVE
OCCULT BLOOD: NEGATIVE
PH, URINE: 7.5 (ref 4.5–8)
PROTEIN (URINE DIPSTICK): NEGATIVE MG/DL
SPECIFIC GRAVITY: 1.01 (ref 1–1.03)
URINE-COLOR: YELLOW
UROBILINOGEN,SEMI-QN: 0.2 MG/DL (ref 0–1.9)

## 2022-09-07 PROCEDURE — 81002 URINALYSIS NONAUTO W/O SCOPE: CPT | Performed by: OBSTETRICS & GYNECOLOGY

## 2022-09-07 PROCEDURE — 3078F DIAST BP <80 MM HG: CPT | Performed by: OBSTETRICS & GYNECOLOGY

## 2022-09-07 PROCEDURE — 3074F SYST BP LT 130 MM HG: CPT | Performed by: OBSTETRICS & GYNECOLOGY

## 2022-09-19 ENCOUNTER — ROUTINE PRENATAL (OUTPATIENT)
Dept: OBGYN CLINIC | Facility: CLINIC | Age: 42
End: 2022-09-19
Payer: COMMERCIAL

## 2022-09-19 VITALS — BODY MASS INDEX: 27 KG/M2 | WEIGHT: 174.38 LBS | SYSTOLIC BLOOD PRESSURE: 110 MMHG | DIASTOLIC BLOOD PRESSURE: 75 MMHG

## 2022-09-19 DIAGNOSIS — Z34.91 ENCOUNTER FOR SUPERVISION OF NORMAL PREGNANCY IN FIRST TRIMESTER, UNSPECIFIED GRAVIDITY: Primary | ICD-10-CM

## 2022-09-19 LAB
APPEARANCE: CLEAR
BILIRUBIN: NEGATIVE
GLUCOSE (URINE DIPSTICK): NEGATIVE MG/DL
KETONES (URINE DIPSTICK): NEGATIVE MG/DL
MULTISTIX LOT#: ABNORMAL NUMERIC
NITRITE, URINE: NEGATIVE
OCCULT BLOOD: NEGATIVE
PH, URINE: 7 (ref 4.5–8)
PROTEIN (URINE DIPSTICK): NEGATIVE MG/DL
SPECIFIC GRAVITY: 1.01 (ref 1–1.03)
URINE-COLOR: YELLOW
UROBILINOGEN,SEMI-QN: 0.2 MG/DL (ref 0–1.9)

## 2022-09-19 PROCEDURE — 3074F SYST BP LT 130 MM HG: CPT | Performed by: OBSTETRICS & GYNECOLOGY

## 2022-09-19 PROCEDURE — 3078F DIAST BP <80 MM HG: CPT | Performed by: OBSTETRICS & GYNECOLOGY

## 2022-09-19 PROCEDURE — 81002 URINALYSIS NONAUTO W/O SCOPE: CPT | Performed by: OBSTETRICS & GYNECOLOGY

## 2022-09-27 ENCOUNTER — LAB ENCOUNTER (OUTPATIENT)
Dept: LAB | Age: 42
End: 2022-09-27
Attending: OBSTETRICS & GYNECOLOGY
Payer: COMMERCIAL

## 2022-09-27 DIAGNOSIS — Z34.91 ENCOUNTER FOR SUPERVISION OF NORMAL PREGNANCY IN FIRST TRIMESTER, UNSPECIFIED GRAVIDITY: ICD-10-CM

## 2022-09-27 LAB
DEPRECATED RDW RBC AUTO: 43.7 FL (ref 35.1–46.3)
ERYTHROCYTE [DISTWIDTH] IN BLOOD BY AUTOMATED COUNT: 12.5 % (ref 11–15)
HCT VFR BLD AUTO: 30.8 %
HGB BLD-MCNC: 10.5 G/DL
MCH RBC QN AUTO: 32.6 PG (ref 26–34)
MCHC RBC AUTO-ENTMCNC: 34.1 G/DL (ref 31–37)
MCV RBC AUTO: 95.7 FL
PLATELET # BLD AUTO: 277 10(3)UL (ref 150–450)
RBC # BLD AUTO: 3.22 X10(6)UL
WBC # BLD AUTO: 10.4 X10(3) UL (ref 4–11)

## 2022-09-27 PROCEDURE — 86780 TREPONEMA PALLIDUM: CPT

## 2022-09-27 PROCEDURE — 36415 COLL VENOUS BLD VENIPUNCTURE: CPT

## 2022-09-27 PROCEDURE — 85027 COMPLETE CBC AUTOMATED: CPT

## 2022-09-27 PROCEDURE — 87389 HIV-1 AG W/HIV-1&-2 AB AG IA: CPT

## 2022-09-27 NOTE — PROGRESS NOTES
Bassem Quintanilla. You have anemia in pregnancy. Begin over the counter Slow Fe ( or generic equivalent ) one daily, separate from time of PNV.

## 2022-09-28 LAB — T PALLIDUM AB SER QL: NEGATIVE

## 2022-09-30 ENCOUNTER — TELEPHONE (OUTPATIENT)
Dept: OBGYN CLINIC | Facility: CLINIC | Age: 42
End: 2022-09-30

## 2022-09-30 PROBLEM — U07.1 COVID-19 AFFECTING PREGNANCY IN THIRD TRIMESTER (HCC): Status: ACTIVE | Noted: 2022-09-30

## 2022-09-30 PROBLEM — U07.1 COVID-19 AFFECTING PREGNANCY IN THIRD TRIMESTER: Status: ACTIVE | Noted: 2022-09-30

## 2022-09-30 PROBLEM — O98.513 COVID-19 AFFECTING PREGNANCY IN THIRD TRIMESTER (HCC): Status: ACTIVE | Noted: 2022-09-30

## 2022-09-30 PROBLEM — O98.513 COVID-19 AFFECTING PREGNANCY IN THIRD TRIMESTER: Status: ACTIVE | Noted: 2022-09-30

## 2022-09-30 NOTE — TELEPHONE ENCOUNTER
Spoke to pt. Has [facility] appt scheduled on 10/14 advised her no further actions needed s to just contact ELENA Aaron to let them know she tested positive so they can update their charts .  Pt verbalized understandings

## 2022-09-30 NOTE — TELEPHONE ENCOUNTER
Pt is 36w today and calling to report that she tested positive for COVID today. Pt stated her symptoms started yesterday and reports sore throat, ear pain, post nasal drip, body aches, and very little cough. Denies fevers. Pt stated that her  tested positive for COVID on Tuesday. Pt advised on symptom management and what medications she can safely take during the pregnancy. Pt advised to rest and push fluids. Pt instructed to go to ER with any SOB or chest pain. Pt informed that message will be sent to referral coordinator for [address] with SUKHWINDERM. Pts next PN appt is on 10/5 with WOOD at [address] [facility] to [facility] if you want to see pt at end of morning schedule,  OR [facility] that day if you can see pt at the end of your schedule at 7pm and have her be brought up the back stairway?

## 2022-10-03 NOTE — TELEPHONE ENCOUNTER
Pt offered appt with WOOD at 1130 at end of her schedule. Pt stated that 1130 is hard for her because she does not have childcare at that time. Pt asking if there is anyway if she can come at the end of the late providers scheduled on 10/5. Message to JUANMAK WILDER [facility] if OK to add pt at 7pm on 10/5?

## 2022-10-03 NOTE — TELEPHONE ENCOUNTER
Pt has not heard back yet as to appointment on 10/5 being changed to last of day due to Covid positive test. Received automated message still scheduled for 8 a.m. Please call.

## 2022-10-03 NOTE — TELEPHONE ENCOUNTER
Message sent again to Antolin Witt [id number]. Pt tested positive for covid. Has appt scheduled on 10/5 with JLK at 8am.  Should pt keep that appt and come up the back stariwell?   Or should appt be moved to the end of K's schedule at 11:30am?

## 2022-10-04 NOTE — TELEPHONE ENCOUNTER
36w4d, pt tested COVID positive on 9/30, pt quarantine window ends on 10/11. Pt was scheduled on 10/5 with JLK in the morning, attempted to move pt to end of JLK schedule. Pt declined due to childcare at 1130. LUCAS late provider in office unable to add, booked until 650 pm that day. Message routed to late providers on 10/6, 10/7 and 10/8 if able to add pt to end of schedule to be seen. To CAP, WOOD and CHACHA to review and advise. Thank you.

## 2022-10-04 NOTE — TELEPHONE ENCOUNTER
Pt called and offered appt with LUCAS tomorrow at 650 pm, pt instructed to park in purple near [facility] and to call office she is there and MA will come and bring pt up to office. Pt states understanding.

## 2022-10-04 NOTE — TELEPHONE ENCOUNTER
Left detailed message that we cannot add to end [facility] Can she make 1130 with WOOD work tomorrow? Await call back.

## 2022-10-05 ENCOUNTER — ROUTINE PRENATAL (OUTPATIENT)
Dept: OBGYN CLINIC | Facility: CLINIC | Age: 42
End: 2022-10-05
Payer: COMMERCIAL

## 2022-10-05 VITALS
SYSTOLIC BLOOD PRESSURE: 120 MMHG | BODY MASS INDEX: 26 KG/M2 | WEIGHT: 172.63 LBS | HEART RATE: 93 BPM | DIASTOLIC BLOOD PRESSURE: 83 MMHG

## 2022-10-05 DIAGNOSIS — O09.523 MULTIGRAVIDA OF ADVANCED MATERNAL AGE IN THIRD TRIMESTER: ICD-10-CM

## 2022-10-05 DIAGNOSIS — Z34.91 ENCOUNTER FOR SUPERVISION OF NORMAL PREGNANCY IN FIRST TRIMESTER, UNSPECIFIED GRAVIDITY: Primary | ICD-10-CM

## 2022-10-05 PROCEDURE — 81002 URINALYSIS NONAUTO W/O SCOPE: CPT | Performed by: OBSTETRICS & GYNECOLOGY

## 2022-10-05 PROCEDURE — 3079F DIAST BP 80-89 MM HG: CPT | Performed by: OBSTETRICS & GYNECOLOGY

## 2022-10-05 PROCEDURE — 3074F SYST BP LT 130 MM HG: CPT | Performed by: OBSTETRICS & GYNECOLOGY

## 2022-10-05 RX ORDER — ALBUTEROL SULFATE 90 UG/1
2 AEROSOL, METERED RESPIRATORY (INHALATION) EVERY 4 HOURS PRN
COMMUNITY
Start: 2022-06-16

## 2022-10-06 ENCOUNTER — HOSPITAL ENCOUNTER (OUTPATIENT)
Dept: PERINATAL CARE | Facility: HOSPITAL | Age: 42
Discharge: HOME | End: 2022-10-06
Attending: OBSTETRICS & GYNECOLOGY
Payer: COMMERCIAL

## 2022-10-06 DIAGNOSIS — O09.523 MULTIGRAVIDA OF ADVANCED MATERNAL AGE IN THIRD TRIMESTER: ICD-10-CM

## 2022-10-06 PROCEDURE — 59025 FETAL NON-STRESS TEST: CPT

## 2022-10-06 PROCEDURE — 59025 FETAL NON-STRESS TEST: CPT | Performed by: OBSTETRICS & GYNECOLOGY

## 2022-10-06 NOTE — PROGRESS NOTES
[address], [place], [place]     AUTHORIZATION FOR SURGICAL OPERATION OR PROCEDURE    I hereby authorize Dr. Marsha Navarro MD, and Dr. Antonette Tyson MD my Physician(s) and whomever may be designated as the doctor's COVID+ with sinus symptoms. GBS collected today. Reminded to schedule NST weekly with MFM.  RTC 1 wk own blood, or a directed donor transfusion, I will discuss this with my Physician. 4. I consent to the photographing of procedure(s) to be performed for the purposes of advancing medicine, science and/or education, provided my identity is not revealed.  If _______________________________________________________________ ____________________________  (Witness signature)                                                                                                  (Date)                                (Time)

## 2022-10-13 ENCOUNTER — HOSPITAL ENCOUNTER (OUTPATIENT)
Facility: HOSPITAL | Age: 42
Discharge: HOME | End: 2022-10-13
Attending: OBSTETRICS & GYNECOLOGY | Admitting: OBSTETRICS & GYNECOLOGY
Payer: COMMERCIAL

## 2022-10-13 ENCOUNTER — ROUTINE PRENATAL (OUTPATIENT)
Dept: OBGYN CLINIC | Facility: CLINIC | Age: 42
End: 2022-10-13
Payer: COMMERCIAL

## 2022-10-13 ENCOUNTER — APPOINTMENT (OUTPATIENT)
Dept: OBGYN CLINIC | Facility: HOSPITAL | Age: 42
End: 2022-10-13
Attending: OBSTETRICS & GYNECOLOGY
Payer: COMMERCIAL

## 2022-10-13 ENCOUNTER — TELEPHONE (OUTPATIENT)
Dept: OBGYN CLINIC | Facility: CLINIC | Age: 42
End: 2022-10-13

## 2022-10-13 VITALS — DIASTOLIC BLOOD PRESSURE: 76 MMHG | SYSTOLIC BLOOD PRESSURE: 122 MMHG | HEART RATE: 88 BPM

## 2022-10-13 VITALS
WEIGHT: 175.5 LBS | DIASTOLIC BLOOD PRESSURE: 75 MMHG | BODY MASS INDEX: 27 KG/M2 | SYSTOLIC BLOOD PRESSURE: 118 MMHG | HEART RATE: 80 BPM

## 2022-10-13 DIAGNOSIS — Z34.90 PREGNANCY: ICD-10-CM

## 2022-10-13 DIAGNOSIS — Z34.91 ENCOUNTER FOR SUPERVISION OF NORMAL PREGNANCY IN FIRST TRIMESTER, UNSPECIFIED GRAVIDITY: Primary | ICD-10-CM

## 2022-10-13 PROCEDURE — 81002 URINALYSIS NONAUTO W/O SCOPE: CPT | Performed by: OBSTETRICS & GYNECOLOGY

## 2022-10-13 PROCEDURE — 3078F DIAST BP <80 MM HG: CPT | Performed by: OBSTETRICS & GYNECOLOGY

## 2022-10-13 PROCEDURE — 3074F SYST BP LT 130 MM HG: CPT | Performed by: OBSTETRICS & GYNECOLOGY

## 2022-10-13 PROCEDURE — 59025 FETAL NON-STRESS TEST: CPT

## 2022-10-13 NOTE — TELEPHONE ENCOUNTER
Needs IOL scheduled week of 10-23 due to AMA-42 y/o. Can RN reach out to her as she may want to know call schedule? Any evening from Sunday the 23rd to Thursday the 27th is OK. Cytotec. She won't need Covid test as she was positive on Sept 30th. Thanks.

## 2022-10-13 NOTE — TELEPHONE ENCOUNTER
Pt states she would prefer to be scheduled on 10/23 or 10/24. Spoke with FBC. Pt is scheduled on 10/23 at 6pm.  Pt informed. Message to [facility], who will be on call that evening.

## 2022-10-13 NOTE — PROGRESS NOTES
GBS pos. Had Covid on 09-30. Already had 3rd trimester EFW so MFM stated no need for repeat. Needs IOL scheduled week of 10-23. Message to RN.

## 2022-10-14 NOTE — PROGRESS NOTES
Pt is a 35year old female admitted to TR2/TR2-A. Patient presents with:  Non-stress Test: AMA and IVF     Pt is  37w6d intra-uterine pregnancy. History obtained, consents signed. Oriented to room, staff, and plan of care.

## 2022-10-17 ENCOUNTER — NST DOCUMENTATION (OUTPATIENT)
Dept: OBGYN CLINIC | Facility: CLINIC | Age: 42
End: 2022-10-17

## 2022-10-17 NOTE — NST
Nonstress Test   Patient: Lakisha Severe    Gestation: 38w3d    Diagnosis from order: Multigravida of advanced maternal age in third trimester  Inpatient order, no diagnosis associated       NST:         NST DOCUMENTATION 10/13/2022   Variability 6-25 BPM   Accelerations Yes   Decelerations None   Baseline 145   Uterine Irritability No   Contractions Irregular   Acoustic Stimulator No   Nonstress Test Interpretation Reactive   Nonstress Test Second Interpretation Reactive   FHR Category Category I   NST Completed by Hu Rico RN   Disposition home    Testing Plan Twice Weekly NST   Provider Notified Dr.Kim MCKENZIE agree with the above evaluation. NST completed.   Susan Roth MD  10/17/2022  6:19 PM

## 2022-10-18 ENCOUNTER — TELEPHONE (OUTPATIENT)
Dept: OBGYN CLINIC | Facility: CLINIC | Age: 42
End: 2022-10-18

## 2022-10-18 NOTE — TELEPHONE ENCOUNTER
Visit notes dated 10/18/2022 received from [address] [place] [address] and placed on [facility]'s desk for review.

## 2022-10-19 ENCOUNTER — ROUTINE PRENATAL (OUTPATIENT)
Dept: OBGYN CLINIC | Facility: CLINIC | Age: 42
End: 2022-10-19
Payer: COMMERCIAL

## 2022-10-19 VITALS
SYSTOLIC BLOOD PRESSURE: 125 MMHG | DIASTOLIC BLOOD PRESSURE: 79 MMHG | BODY MASS INDEX: 26 KG/M2 | WEIGHT: 172.38 LBS | HEART RATE: 94 BPM

## 2022-10-19 DIAGNOSIS — Z34.93 ENCOUNTER FOR SUPERVISION OF NORMAL PREGNANCY IN THIRD TRIMESTER, UNSPECIFIED GRAVIDITY: Primary | ICD-10-CM

## 2022-10-19 LAB
BILIRUBIN: NEGATIVE
GLUCOSE (URINE DIPSTICK): NEGATIVE MG/DL
KETONES (URINE DIPSTICK): NEGATIVE MG/DL
LEUKOCYTES: NEGATIVE
MULTISTIX LOT#: NORMAL NUMERIC
NITRITE, URINE: NEGATIVE
OCCULT BLOOD: NEGATIVE
PH, URINE: 6 (ref 4.5–8)
PROTEIN (URINE DIPSTICK): NEGATIVE MG/DL
SPECIFIC GRAVITY: 1.02 (ref 1–1.03)
UROBILINOGEN,SEMI-QN: 0.2 MG/DL (ref 0–1.9)

## 2022-10-19 PROCEDURE — 3078F DIAST BP <80 MM HG: CPT | Performed by: OBSTETRICS & GYNECOLOGY

## 2022-10-19 PROCEDURE — 81002 URINALYSIS NONAUTO W/O SCOPE: CPT | Performed by: OBSTETRICS & GYNECOLOGY

## 2022-10-19 PROCEDURE — 3074F SYST BP LT 130 MM HG: CPT | Performed by: OBSTETRICS & GYNECOLOGY

## 2022-10-19 NOTE — PROGRESS NOTES
No issues. Doing ANT with MFM. IOL Ilan night. Discussed ripening. Has NST Friday. All questions answered.

## 2022-10-20 ENCOUNTER — HOSPITAL ENCOUNTER (OUTPATIENT)
Facility: HOSPITAL | Age: 42
Discharge: HOME | End: 2022-10-20
Attending: OBSTETRICS & GYNECOLOGY | Admitting: OBSTETRICS & GYNECOLOGY
Payer: COMMERCIAL

## 2022-10-20 ENCOUNTER — NST DOCUMENTATION (OUTPATIENT)
Dept: OBGYN CLINIC | Facility: CLINIC | Age: 42
End: 2022-10-20

## 2022-10-20 ENCOUNTER — APPOINTMENT (OUTPATIENT)
Dept: OBGYN CLINIC | Facility: HOSPITAL | Age: 42
End: 2022-10-20
Attending: OBSTETRICS & GYNECOLOGY
Payer: COMMERCIAL

## 2022-10-20 VITALS
DIASTOLIC BLOOD PRESSURE: 81 MMHG | RESPIRATION RATE: 16 BRPM | TEMPERATURE: 98 F | HEART RATE: 94 BPM | SYSTOLIC BLOOD PRESSURE: 121 MMHG

## 2022-10-20 PROCEDURE — 59025 FETAL NON-STRESS TEST: CPT

## 2022-10-20 PROCEDURE — 59025 FETAL NON-STRESS TEST: CPT | Performed by: OBSTETRICS & GYNECOLOGY

## 2022-10-20 NOTE — PROGRESS NOTES
Pt is a 35year old female admitted to TR5/TR5-A. Patient presents with:  Non-stress Test: ama, ivf      Pt is  38w6d intra-uterine pregnancy. History obtained, consents signed. Oriented to room, staff, and plan of care.

## 2022-10-21 NOTE — NST
Nonstress Test   Patient: Mirna Smoker    Gestation: 38w6d    Diagnosis from order: Multigravida of advanced maternal age in third trimester  Inpatient order, no diagnosis associated       NST:         NST DOCUMENTATION 10/20/2022   Variability 6-25 BPM   Accelerations Yes   Decelerations None   Baseline 145   Uterine Irritability No   Contractions Not present   Acoustic Stimulator No   Nonstress Test Interpretation Reactive   Nonstress Test Second Interpretation Reactive   FHR Category Category I   Comments , nst for ama and ivf, nst reactive, ha kunz reviewed tracing, pt discharged home, IOL on  evening   NST Completed by wero faust rn   Disposition home    Testing Plan -   Provider Notified ha kunz         I agree with the above evaluation. NST completed. Taylor Onofre MD  10/20/2022  7:01 PM

## 2022-10-22 ENCOUNTER — NST DOCUMENTATION (OUTPATIENT)
Dept: OBGYN CLINIC | Facility: CLINIC | Age: 42
End: 2022-10-22

## 2022-10-22 NOTE — NST
Nonstress Test   Patient: Royal Kellogg    Gestation: 36w3d    Diagnosis from order: Multigravida of advanced maternal age in third trimester  Inpatient order, no diagnosis associated       NST:         NST DOCUMENTATION 10/06/2022   Variability 6-25 BPM   Accelerations Yes   Decelerations None   Baseline 150   Uterine Irritability No   Contractions Not present   Acoustic Stimulator No   Nonstress Test Interpretation Reactive   Nonstress Test Second Interpretation Reactive   FHR Category Category I   Comments , nst for ama and ivf, nst reactive, ha kunz reviewed tracing, pt discharged home   NST Completed by Mert Gong   [facility] home    Testing Plan -   Provider Notified Faraz Son         I agree with the above evaluation. NST completed.   Shay Frias MD

## 2022-10-23 ENCOUNTER — APPOINTMENT (OUTPATIENT)
Dept: OBGYN CLINIC | Facility: HOSPITAL | Age: 42
End: 2022-10-23
Attending: OBSTETRICS & GYNECOLOGY
Payer: COMMERCIAL

## 2022-10-23 ENCOUNTER — HOSPITAL ENCOUNTER (INPATIENT)
Facility: HOSPITAL | Age: 42
LOS: 4 days | Discharge: HOME | End: 2022-10-27
Attending: OBSTETRICS & GYNECOLOGY | Admitting: OBSTETRICS & GYNECOLOGY
Payer: COMMERCIAL

## 2022-10-23 PROBLEM — Z34.90 PREGNANCY (HCC): Status: ACTIVE | Noted: 2022-10-23

## 2022-10-23 PROBLEM — Z34.90 PREGNANCY: Status: ACTIVE | Noted: 2022-10-23

## 2022-10-23 LAB
ANTIBODY SCREEN: NEGATIVE
BASOPHILS # BLD AUTO: 0.02 X10(3) UL (ref 0–0.2)
BASOPHILS NFR BLD AUTO: 0.2 %
DEPRECATED RDW RBC AUTO: 48.4 FL (ref 35.1–46.3)
EOSINOPHIL # BLD AUTO: 0.07 X10(3) UL (ref 0–0.7)
EOSINOPHIL NFR BLD AUTO: 0.6 %
ERYTHROCYTE [DISTWIDTH] IN BLOOD BY AUTOMATED COUNT: 13.8 % (ref 11–15)
HCT VFR BLD AUTO: 35 %
HGB BLD-MCNC: 12.1 G/DL
IMM GRANULOCYTES # BLD AUTO: 0.07 X10(3) UL (ref 0–1)
IMM GRANULOCYTES NFR BLD: 0.6 %
LYMPHOCYTES # BLD AUTO: 3.03 X10(3) UL (ref 1–4)
LYMPHOCYTES NFR BLD AUTO: 27.3 %
MCH RBC QN AUTO: 33.1 PG (ref 26–34)
MCHC RBC AUTO-ENTMCNC: 34.6 G/DL (ref 31–37)
MCV RBC AUTO: 95.6 FL
MONOCYTES # BLD AUTO: 0.67 X10(3) UL (ref 0.1–1)
MONOCYTES NFR BLD AUTO: 6 %
NEUTROPHILS # BLD AUTO: 7.25 X10 (3) UL (ref 1.5–7.7)
NEUTROPHILS # BLD AUTO: 7.25 X10(3) UL (ref 1.5–7.7)
NEUTROPHILS NFR BLD AUTO: 65.3 %
PLATELET # BLD AUTO: 253 10(3)UL (ref 150–450)
RBC # BLD AUTO: 3.66 X10(6)UL
RH BLOOD TYPE: POSITIVE
WBC # BLD AUTO: 11.1 X10(3) UL (ref 4–11)

## 2022-10-23 PROCEDURE — 3E0P7VZ INTRODUCTION OF HORMONE INTO FEMALE REPRODUCTIVE, VIA NATURAL OR ARTIFICIAL OPENING: ICD-10-PCS | Performed by: OBSTETRICS & GYNECOLOGY

## 2022-10-23 RX ORDER — TRISODIUM CITRATE DIHYDRATE AND CITRIC ACID MONOHYDRATE 500; 334 MG/5ML; MG/5ML
30 SOLUTION ORAL AS NEEDED
Status: DISCONTINUED | OUTPATIENT
Start: 2022-10-23 | End: 2022-10-25 | Stop reason: HOSPADM

## 2022-10-23 RX ORDER — AMMONIA INHALANTS 0.04 G/.3ML
0.3 INHALANT RESPIRATORY (INHALATION) AS NEEDED
Status: DISCONTINUED | OUTPATIENT
Start: 2022-10-23 | End: 2022-10-25 | Stop reason: HOSPADM

## 2022-10-23 RX ORDER — IBUPROFEN 600 MG/1
600 TABLET ORAL EVERY 6 HOURS PRN
Status: DISCONTINUED | OUTPATIENT
Start: 2022-10-23 | End: 2022-10-25 | Stop reason: HOSPADM

## 2022-10-23 RX ORDER — SODIUM CHLORIDE, SODIUM LACTATE, POTASSIUM CHLORIDE, CALCIUM CHLORIDE 600; 310; 30; 20 MG/100ML; MG/100ML; MG/100ML; MG/100ML
INJECTION, SOLUTION INTRAVENOUS CONTINUOUS
Status: DISCONTINUED | OUTPATIENT
Start: 2022-10-23 | End: 2022-10-25 | Stop reason: HOSPADM

## 2022-10-23 RX ORDER — DEXTROSE, SODIUM CHLORIDE, SODIUM LACTATE, POTASSIUM CHLORIDE, AND CALCIUM CHLORIDE 5; .6; .31; .03; .02 G/100ML; G/100ML; G/100ML; G/100ML; G/100ML
INJECTION, SOLUTION INTRAVENOUS AS NEEDED
Status: DISCONTINUED | OUTPATIENT
Start: 2022-10-23 | End: 2022-10-25 | Stop reason: HOSPADM

## 2022-10-23 RX ORDER — ZOLPIDEM TARTRATE 5 MG/1
5 TABLET ORAL NIGHTLY PRN
Status: DISCONTINUED | OUTPATIENT
Start: 2022-10-23 | End: 2022-10-25 | Stop reason: HOSPADM

## 2022-10-23 RX ORDER — ONDANSETRON 2 MG/ML
4 INJECTION INTRAMUSCULAR; INTRAVENOUS EVERY 6 HOURS PRN
Status: DISCONTINUED | OUTPATIENT
Start: 2022-10-23 | End: 2022-10-25 | Stop reason: HOSPADM

## 2022-10-23 RX ORDER — LIDOCAINE HYDROCHLORIDE 10 MG/ML
30 INJECTION, SOLUTION EPIDURAL; INFILTRATION; INTRACAUDAL; PERINEURAL ONCE
Status: DISCONTINUED | OUTPATIENT
Start: 2022-10-23 | End: 2022-10-25 | Stop reason: HOSPADM

## 2022-10-23 RX ORDER — ACETAMINOPHEN 500 MG
500 TABLET ORAL EVERY 6 HOURS PRN
Status: DISCONTINUED | OUTPATIENT
Start: 2022-10-23 | End: 2022-10-25 | Stop reason: HOSPADM

## 2022-10-23 RX ORDER — TERBUTALINE SULFATE 1 MG/ML
0.25 INJECTION, SOLUTION SUBCUTANEOUS AS NEEDED
Status: DISCONTINUED | OUTPATIENT
Start: 2022-10-23 | End: 2022-10-25 | Stop reason: HOSPADM

## 2022-10-24 ENCOUNTER — ANESTHESIA EVENT (OUTPATIENT)
Dept: OBGYN UNIT | Facility: HOSPITAL | Age: 42
End: 2022-10-24
Payer: COMMERCIAL

## 2022-10-24 ENCOUNTER — ANESTHESIA (OUTPATIENT)
Dept: OBGYN UNIT | Facility: HOSPITAL | Age: 42
End: 2022-10-24
Payer: COMMERCIAL

## 2022-10-24 RX ORDER — NALBUPHINE HCL 10 MG/ML
2.5 AMPUL (ML) INJECTION
Status: DISCONTINUED | OUTPATIENT
Start: 2022-10-24 | End: 2022-10-27

## 2022-10-24 RX ORDER — BUPIVACAINE HCL/0.9 % NACL/PF 0.25 %
5 PLASTIC BAG, INJECTION (ML) EPIDURAL AS NEEDED
Status: DISCONTINUED | OUTPATIENT
Start: 2022-10-24 | End: 2022-10-27

## 2022-10-24 RX ORDER — BUPIVACAINE HYDROCHLORIDE 2.5 MG/ML
20 INJECTION, SOLUTION EPIDURAL; INFILTRATION; INTRACAUDAL ONCE
Status: DISCONTINUED | OUTPATIENT
Start: 2022-10-24 | End: 2022-10-25 | Stop reason: HOSPADM

## 2022-10-24 RX ADMIN — LIDOCAINE HYDROCHLORIDE 5 ML: 10 INJECTION, SOLUTION INFILTRATION; PERINEURAL at 23:50:00

## 2022-10-24 NOTE — PROGRESS NOTES
Pt is a 35year old female admitted to 373/373-A. Patient presents with:  Scheduled Induction: AMA     Pt is  39w2d intra-uterine pregnancy. History obtained, consents signed. Oriented to room, staff, and plan of care.

## 2022-10-25 PROCEDURE — 0UQG7ZZ REPAIR VAGINA, VIA NATURAL OR ARTIFICIAL OPENING: ICD-10-PCS | Performed by: OBSTETRICS & GYNECOLOGY

## 2022-10-25 PROCEDURE — 10D17Z9 MANUAL EXTRACTION OF PRODUCTS OF CONCEPTION, RETAINED, VIA NATURAL OR ARTIFICIAL OPENING: ICD-10-PCS | Performed by: OBSTETRICS & GYNECOLOGY

## 2022-10-25 PROCEDURE — 10907ZC DRAINAGE OF AMNIOTIC FLUID, THERAPEUTIC FROM PRODUCTS OF CONCEPTION, VIA NATURAL OR ARTIFICIAL OPENING: ICD-10-PCS | Performed by: OBSTETRICS & GYNECOLOGY

## 2022-10-25 PROCEDURE — 59400 OBSTETRICAL CARE: CPT | Performed by: OBSTETRICS & GYNECOLOGY

## 2022-10-25 PROCEDURE — 3E033VJ INTRODUCTION OF OTHER HORMONE INTO PERIPHERAL VEIN, PERCUTANEOUS APPROACH: ICD-10-PCS | Performed by: OBSTETRICS & GYNECOLOGY

## 2022-10-25 PROCEDURE — 10H07YZ INSERTION OF OTHER DEVICE INTO PRODUCTS OF CONCEPTION, VIA NATURAL OR ARTIFICIAL OPENING: ICD-10-PCS | Performed by: OBSTETRICS & GYNECOLOGY

## 2022-10-25 RX ORDER — IBUPROFEN 600 MG/1
600 TABLET ORAL EVERY 6 HOURS
Status: DISCONTINUED | OUTPATIENT
Start: 2022-10-25 | End: 2022-10-27

## 2022-10-25 RX ORDER — BUPIVACAINE HYDROCHLORIDE 2.5 MG/ML
20 INJECTION, SOLUTION EPIDURAL; INFILTRATION; INTRACAUDAL ONCE
Status: DISCONTINUED | OUTPATIENT
Start: 2022-10-25 | End: 2022-10-25 | Stop reason: HOSPADM

## 2022-10-25 RX ORDER — AMMONIA INHALANTS 0.04 G/.3ML
0.3 INHALANT RESPIRATORY (INHALATION) AS NEEDED
Status: DISCONTINUED | OUTPATIENT
Start: 2022-10-25 | End: 2022-10-27

## 2022-10-25 RX ORDER — METHYLERGONOVINE MALEATE 0.2 MG/ML
0.2 INJECTION INTRAVENOUS ONCE
Status: COMPLETED | OUTPATIENT
Start: 2022-10-25 | End: 2022-10-25

## 2022-10-25 RX ORDER — DIAPER,BRIEF,INFANT-TODD,DISP
1 EACH MISCELLANEOUS EVERY 6 HOURS PRN
Status: DISCONTINUED | OUTPATIENT
Start: 2022-10-25 | End: 2022-10-27

## 2022-10-25 RX ORDER — ACETAMINOPHEN 500 MG
500 TABLET ORAL EVERY 6 HOURS PRN
Status: DISCONTINUED | OUTPATIENT
Start: 2022-10-25 | End: 2022-10-27

## 2022-10-25 RX ORDER — DIPHENHYDRAMINE HYDROCHLORIDE 50 MG/ML
12.5 INJECTION INTRAMUSCULAR; INTRAVENOUS EVERY 4 HOURS PRN
Status: DISCONTINUED | OUTPATIENT
Start: 2022-10-25 | End: 2022-10-27

## 2022-10-25 RX ORDER — CHOLECALCIFEROL (VITAMIN D3) 25 MCG
1 TABLET,CHEWABLE ORAL DAILY
Status: DISCONTINUED | OUTPATIENT
Start: 2022-10-25 | End: 2022-10-27

## 2022-10-25 RX ORDER — BUPIVACAINE HCL/0.9 % NACL/PF 0.25 %
5 PLASTIC BAG, INJECTION (ML) EPIDURAL AS NEEDED
Status: DISCONTINUED | OUTPATIENT
Start: 2022-10-25 | End: 2022-10-27

## 2022-10-25 RX ORDER — BISACODYL 10 MG
10 SUPPOSITORY, RECTAL RECTAL ONCE AS NEEDED
Status: DISCONTINUED | OUTPATIENT
Start: 2022-10-25 | End: 2022-10-27

## 2022-10-25 RX ORDER — CEFAZOLIN SODIUM/WATER 2 G/20 ML
2 SYRINGE (ML) INTRAVENOUS ONCE
Status: COMPLETED | OUTPATIENT
Start: 2022-10-25 | End: 2022-10-25

## 2022-10-25 RX ORDER — ACETAMINOPHEN 500 MG
1000 TABLET ORAL EVERY 6 HOURS PRN
Status: DISCONTINUED | OUTPATIENT
Start: 2022-10-25 | End: 2022-10-27

## 2022-10-25 RX ORDER — ONDANSETRON 2 MG/ML
4 INJECTION INTRAMUSCULAR; INTRAVENOUS EVERY 6 HOURS PRN
Status: DISCONTINUED | OUTPATIENT
Start: 2022-10-25 | End: 2022-10-27

## 2022-10-25 RX ORDER — LIDOCAINE HYDROCHLORIDE 10 MG/ML
INJECTION, SOLUTION INFILTRATION; PERINEURAL
Status: COMPLETED | OUTPATIENT
Start: 2022-10-24 | End: 2022-10-24

## 2022-10-25 RX ORDER — SIMETHICONE 80 MG
80 TABLET,CHEWABLE ORAL 3 TIMES DAILY PRN
Status: DISCONTINUED | OUTPATIENT
Start: 2022-10-25 | End: 2022-10-27

## 2022-10-25 RX ORDER — CEFAZOLIN SODIUM/WATER 2 G/20 ML
SYRINGE (ML) INTRAVENOUS
Status: DISPENSED
Start: 2022-10-25 | End: 2022-10-26

## 2022-10-25 RX ORDER — DOCUSATE SODIUM 100 MG/1
100 CAPSULE, LIQUID FILLED ORAL
Status: DISCONTINUED | OUTPATIENT
Start: 2022-10-25 | End: 2022-10-27

## 2022-10-25 RX ORDER — METHYLERGONOVINE MALEATE 0.2 MG/ML
INJECTION INTRAVENOUS
Status: DISPENSED
Start: 2022-10-25 | End: 2022-10-26

## 2022-10-25 RX ORDER — NALBUPHINE HCL 10 MG/ML
2.5 AMPUL (ML) INJECTION
Status: DISCONTINUED | OUTPATIENT
Start: 2022-10-25 | End: 2022-10-27

## 2022-10-25 RX ORDER — TRANEXAMIC ACID 10 MG/ML
INJECTION, SOLUTION INTRAVENOUS
Status: COMPLETED
Start: 2022-10-25 | End: 2022-10-25

## 2022-10-25 RX ORDER — MISOPROSTOL 200 UG/1
TABLET ORAL
Status: COMPLETED
Start: 2022-10-25 | End: 2022-10-25

## 2022-10-25 RX ORDER — LIDOCAINE HYDROCHLORIDE AND EPINEPHRINE 15; 5 MG/ML; UG/ML
INJECTION, SOLUTION EPIDURAL AS NEEDED
Status: DISCONTINUED | OUTPATIENT
Start: 2022-10-25 | End: 2022-10-25 | Stop reason: SURG

## 2022-10-25 RX ADMIN — LIDOCAINE HYDROCHLORIDE AND EPINEPHRINE 4 ML: 15; 5 INJECTION, SOLUTION EPIDURAL at 00:00:00

## 2022-10-25 NOTE — PROGRESS NOTES
Feeling pressure  10cm/100/+2  fht with moderate variability and occasional variables  Start 2nd stage  Anticipate   Continue GBS ppx

## 2022-10-25 NOTE — L&D DELIVERY NOTE
[facility]    Vaginal Delivery Note    Cynthia Ribera Patient Status:  Inpatient    1980 MRN [id number]   Location [address] Attending Ricardo Peters MD   Hosp Day # 2 PCP No primary care provider on file. Delivery     Infant  Date of Delivery: 10/25/2022   Time of Delivery: 1:36 PM  Delivery Type: Normal spontaneous vaginal delivery    Infant Sex  Information for the patient's : Brain Hodgkins Girl [[id number]]   female    Infant Birthweight  Information for the patient's : Trevor Bullock, Girl [[id number]]   7 lb 6.9 oz (3.37 kg)     Presentation Vertex [1]  Position Right [3] Occiput [1] Anterior [1]    Apgars:  1 minute: 9               5 minutes: 9                     Neonatologist Present: no      Placenta  Date/Time of Delivery: 10/25/2022  2:09 PM   Delivery: manual extraction  Placenta to Pathology: yes--IVF/AMA/Covid19 in pregnancy      Cord Gases Submitted: no  Cord Complications: single nuchal    Maternal Anesthesia: epidural     Episiotomy/Laceration Repair  Left vaginal sulcus repaired with 2/0 and 3/0 vicryl    Delivery Complications  Undiagnosed velamentous cord with subsequent avulsion after 30 mins of gentle traction  Retained Placenta with atony/PPH    Quantitative Blood Loss (mL) 1,145      Delivery Comment:     Baby JUDSON. Single nuchal reduced on perineum. Shoulders delivered atraumatically followed by the trunk and LE. Nasopharyngeally bulb suctioned at the perineum and infant vigorous. Cord clamped and cut after 30 sec delay. Baby handed to the mom with nursing personal present. Placenta did not readily deliver with gentle traction and uterine massage. As the cord grew longer, it the membranes started to show connected to the cord showing undiagnosed velamentous cord. With time, palpable cord  from membranes and subsequent inevitable avulsion.   Pit was turned off to allow for manual removal.  bimanual exam performed and plane created between uteroplacental interface and placenta removed intact. Multiple sweeps performed around the cavity and no palpable tissue noted. Atony noted and pitocin to 600cc/hr and IM methergine x 1 dose given. Good tone subsequently with scant bleeding. Ancef 2g x 1 dose given to exploration. Repair as above with good hemostasis and anatomic re approximation. Sphincter intact. Rectum and vagina clear of sponges.  and patient stable in the delivery room with nursing present. Sponge and needle counts verified with RNs present.         Earle Monterroso DO   10/25/2022  3:40 PM

## 2022-10-25 NOTE — PROGRESS NOTES
Patient up to bathroom with assist x 2. Voided 200mL. Patient transferred to mother/baby room 361 per wheelchair in stable condition with baby and personal belongings. Accompanied by significant other and staff. Report given to Elissa Guerrero mother/baby RN.

## 2022-10-25 NOTE — ANESTHESIA PROCEDURE NOTES
Labor Analgesia    Date/Time: 10/24/2022 11:50 PM  Performed by: Anusha Robins MD  Authorized by: Anusha Robins MD       General Information and Staff    Start Time:  10/25/2022 12:05 AM  End Time:  10/25/2022 12:05 AM  Anesthesiologist:  Anusha Robins MD  Performed by:   Anesthesiologist  Patient Location:  OB  Site Identification: surface landmarks    Reason for Block: labor epidural    Preanesthetic Checklist: patient identified, IV checked, site marked, risks and benefits discussed, monitors and equipment checked, pre-op evaluation, timeout performed, IV bolus, anesthesia consent and sterile technique used      Procedure Details    Patient Position:  Sitting  Prep: Betadine, ChloraPrep and patient draped    Monitoring:  Heart rate, cardiac monitor and continuous pulse ox  Approach:  Midline    Epidural Needle    Injection Technique:  PARAM saline  Needle Type:  Tuohy  Needle Gauge:  18 G  Needle Length:  3.5 in  Needle Insertion Depth:  4.5  Location:  L4-5    Spinal Needle      Catheter    Catheter Type:  End hole  Catheter Size:  20 G  Catheter at Skin Depth:  11  Test Dose:  Negative    Assessment  Sensory Level:  T4    Additional Comments     Epidural placed easily on first attempt, no heme or CSF

## 2022-10-26 LAB
BASOPHILS # BLD AUTO: 0.06 X10(3) UL (ref 0–0.2)
BASOPHILS NFR BLD AUTO: 0.5 %
DEPRECATED RDW RBC AUTO: 49.1 FL (ref 35.1–46.3)
EOSINOPHIL # BLD AUTO: 0.18 X10(3) UL (ref 0–0.7)
EOSINOPHIL NFR BLD AUTO: 1.4 %
ERYTHROCYTE [DISTWIDTH] IN BLOOD BY AUTOMATED COUNT: 14 % (ref 11–15)
HCT VFR BLD AUTO: 29.8 %
HGB BLD-MCNC: 10.1 G/DL
IMM GRANULOCYTES # BLD AUTO: 0.07 X10(3) UL (ref 0–1)
IMM GRANULOCYTES NFR BLD: 0.6 %
LYMPHOCYTES # BLD AUTO: 2.99 X10(3) UL (ref 1–4)
LYMPHOCYTES NFR BLD AUTO: 23.8 %
MCH RBC QN AUTO: 32.8 PG (ref 26–34)
MCHC RBC AUTO-ENTMCNC: 33.9 G/DL (ref 31–37)
MCV RBC AUTO: 96.8 FL
MONOCYTES # BLD AUTO: 0.8 X10(3) UL (ref 0.1–1)
MONOCYTES NFR BLD AUTO: 6.4 %
NEUTROPHILS # BLD AUTO: 8.48 X10 (3) UL (ref 1.5–7.7)
NEUTROPHILS # BLD AUTO: 8.48 X10(3) UL (ref 1.5–7.7)
NEUTROPHILS NFR BLD AUTO: 67.3 %
PLATELET # BLD AUTO: 187 10(3)UL (ref 150–450)
RBC # BLD AUTO: 3.08 X10(6)UL
WBC # BLD AUTO: 12.6 X10(3) UL (ref 4–11)

## 2022-10-26 NOTE — PLAN OF CARE
Problem: Patient Centered Care  Goal: Patient preferences are identified and integrated in the patient's plan of care  Description: Interventions:  - What would you like us to know as we care for you?  - Provide timely, complete, and accurate information to patient/family  - Incorporate patient and family knowledge, values, beliefs, and cultural backgrounds into the planning and delivery of care  - Encourage patient/family to participate in care and decision-making at the level they choose  - Honor patient and family perspectives and choices  Outcome: Progressing     Problem: Patient/Family Goals  Goal: Patient/Family Long Term Goal  Description: Patient's Long Term Goal:     Interventions:  -  - See additional Care Plan goals for specific interventions  Outcome: Progressing  Goal: Patient/Family Short Term Goal  Description: Patient's Short Term Goal:    Interventions:   -   - See additional Care Plan goals for specific interventions  Outcome: Progressing     Problem: POSTPARTUM  Goal: Long Term Goal:Experiences normal postpartum course  Description: INTERVENTIONS:  - Assess and monitor vital signs and lab values. - Assess fundus and lochia. - Provide ice/sitz baths for perineum discomfort. - Monitor healing of incision/episiotomy/laceration, and assess for signs and symptoms of infection and hematoma. - Assess bladder function and monitor for bladder distention.  - Provide/instruct/assist with pericare as needed. - Provide VTE prophylaxis as needed. - Monitor bowel function.  - Encourage ambulation and provide assistance as needed. - Assess and monitor emotional status and provide social service/psych resources as needed. - Utilize standard precautions and use personal protective equipment as indicated. Ensure aseptic care of all intravenous lines and invasive tubes/drains.  - Obtain immunization and exposure to communicable diseases history.   Outcome: Progressing  Goal: Optimize infant feeding at the breast  Description: INTERVENTIONS:  - Initiate breast feeding within first hour after birth. - Monitor effectiveness of current breast feeding efforts. - Assess support systems available to mother/family.  - Identify cultural beliefs/practices regarding lactation, letdown techniques, maternal food preferences. - Assess mother's knowledge and previous experience with breast feeding.  - Provide information as needed about early infant feeding cues (e.g., rooting, lip smacking, sucking fingers/hand) versus late cue of crying.  - Discuss/demonstrate breast feeding aids (e.g., infant sling, nursing footstool/pillows, and breast pumps). - Encourage mother/other family members to express feelings/concerns, and actively listen. - Educate father/SO about benefits of breast feeding and how to manage common lactation challenges. - Recommend avoidance of specific medications or substances incompatible with breast feeding.  - Assess and monitor for signs of nipple pain/trauma. - Instruct and provide assistance with proper latch. - Review techniques for milk expression (breast pumping) and storage of breast milk. Provide pumping equipment/supplies, instructions and assistance, as needed. - Encourage rooming-in and breast feeding on demand.  - Encourage skin-to-skin contact. - Provide LC support as needed. - Assess for and manage engorgement. - Provide breast feeding education handouts and information on community breast feeding support. Outcome: Progressing  Goal: Establishment of adequate milk supply with medication/procedure interruptions  Description: INTERVENTIONS:  - Review techniques for milk expression (breast pumping). - Provide pumping equipment/supplies, instructions, and assistance until it is safe to breastfeed infant. Outcome: Progressing  Goal: Experiences normal breast weaning course  Description: INTERVENTIONS:  - Assess for and manage engorgement. - Instruct on breast care.   - Provide comfort measures. Outcome: Progressing  Goal: Appropriate maternal -  bonding  Description: INTERVENTIONS:  - Assess caregiver- interactions. - Assess caregiver's emotional status and coping mechanisms. - Encourage caregiver to participate in  daily care. - Assess support systems available to mother/family.  - Provide /case management support as needed.   Outcome: Progressing

## 2022-10-26 NOTE — LACTATION NOTE
LACTATION NOTE - MOTHER      Evaluation Type: Inpatient    Problems identified  Problems identified: Knowledge deficit; Nipple pain    Maternal history  Maternal history: AMA; Induction of labor; Infertility  Other/comment: IVF pregnancy    Breastfeeding goal  Breastfeeding goal: To maintain breast milk feeding per patient goal    Maternal Assessment  Bilateral Breasts: Soft;Symmetrical  Bilateral Nipples: WNL; Everted  Prior breastfeeding experience (comment below): Multip;Problems, continued  Prior BF experience: comment: TT revision at 4 weeks, continued triple feeding for 8 months  Breastfeeding Assistance: Breastfeeding assistance provided with permission    Pain assessment  Pain, additional: Pain location  Pain Location: Nipples  Treatment of Sore Nipples: Deeper latch techniques; Lanolin    Guidelines for use of:  Breast pump type: Spectra  Current use of pump[de-identified] Refused to pump at this time  Other (comment): Pt c/o of nipple pain with feedings. Had multiple issues with first baby, had TT revision but still had problems. Pediatrician assesed and did not diagnose TT at this time. Assisted with latch, infant positioned far from mother and latch very shallow. Assisted with deeper latch techniques and better positioning in cross cradle hold. Infant latched deeply and nursed with audible swallows. Pt still reporting pain with latch but does improve as feeding continues. Offered pt pump and suggested exclusive pumping if BF is too painful but pt declined at this time.

## 2022-10-26 NOTE — PLAN OF CARE
Problem: POSTPARTUM  Goal: Long Term Goal:Experiences normal postpartum course  Description: INTERVENTIONS:  - Assess and monitor vital signs and lab values. - Assess fundus and lochia. - Provide ice/sitz baths for perineum discomfort. - Monitor healing of incision/episiotomy/laceration, and assess for signs and symptoms of infection and hematoma. - Assess bladder function and monitor for bladder distention.  - Provide/instruct/assist with pericare as needed. - Provide VTE prophylaxis as needed. - Monitor bowel function.  - Encourage ambulation and provide assistance as needed. - Assess and monitor emotional status and provide social service/psych resources as needed. - Utilize standard precautions and use personal protective equipment as indicated. Ensure aseptic care of all intravenous lines and invasive tubes/drains.  - Obtain immunization and exposure to communicable diseases history. 10/26/2022 0924 by Azra Cornelius RN  Outcome: Progressing  10/25/2022 1937 by Azra Cornelius RN  Outcome: Progressing     Problem: POSTPARTUM  Goal: Optimize infant feeding at the breast  Description: INTERVENTIONS:  - Initiate breast feeding within first hour after birth. - Monitor effectiveness of current breast feeding efforts. - Assess support systems available to mother/family.  - Identify cultural beliefs/practices regarding lactation, letdown techniques, maternal food preferences. - Assess mother's knowledge and previous experience with breast feeding.  - Provide information as needed about early infant feeding cues (e.g., rooting, lip smacking, sucking fingers/hand) versus late cue of crying.  - Discuss/demonstrate breast feeding aids (e.g., infant sling, nursing footstool/pillows, and breast pumps). - Encourage mother/other family members to express feelings/concerns, and actively listen. - Educate father/SO about benefits of breast feeding and how to manage common lactation challenges.   - Recommend avoidance of specific medications or substances incompatible with breast feeding.  - Assess and monitor for signs of nipple pain/trauma. - Instruct and provide assistance with proper latch. - Review techniques for milk expression (breast pumping) and storage of breast milk. Provide pumping equipment/supplies, instructions and assistance, as needed. - Encourage rooming-in and breast feeding on demand.  - Encourage skin-to-skin contact. - Provide LC support as needed. - Assess for and manage engorgement. - Provide breast feeding education handouts and information on community breast feeding support. 10/26/2022 0924 by Moon Jose RN  Outcome: Progressing  10/25/2022 1937 by Moon Jose RN  Outcome: Progressing     Problem: POSTPARTUM  Goal: Establishment of adequate milk supply with medication/procedure interruptions  Description: INTERVENTIONS:  - Review techniques for milk expression (breast pumping). - Provide pumping equipment/supplies, instructions, and assistance until it is safe to breastfeed infant. 10/26/2022 0924 by Moon Jose RN  Outcome: Progressing  10/25/2022 1937 by Moon Jose RN  Outcome: Progressing     Problem: POSTPARTUM  Goal: Experiences normal breast weaning course  Description: INTERVENTIONS:  - Assess for and manage engorgement. - Instruct on breast care. - Provide comfort measures. 10/26/2022 0924 by Moon Jose RN  Outcome: Progressing  10/25/2022 1937 by Moon Jose RN  Outcome: Progressing     Problem: POSTPARTUM  Goal: Appropriate maternal -  bonding  Description: INTERVENTIONS:  - Assess caregiver- interactions. - Assess caregiver's emotional status and coping mechanisms. - Encourage caregiver to participate in  daily care. - Assess support systems available to mother/family.  - Provide /case management support as needed.   10/26/2022 0924 by Moon Jose RN  Outcome: Progressing  10/25/2022 1937 by Gila Rm RN  Outcome: Progressing

## 2022-10-26 NOTE — ANESTHESIA POSTPROCEDURE EVALUATION
Patient: Georgann Dancer    Procedure Summary     Date: 10/24/22 Room / Location:     Anesthesia Start: 7256 Anesthesia Stop: 10/25/22 1409    Procedure: LABOR ANALGESIA Diagnosis:     Scheduled Providers:  Anesthesiologist: Jacoby Walton DO    Anesthesia Type: epidural ASA Status: 2          Anesthesia Type: epidural    Vitals Value Taken Time   /80 10/25/22 1744   Temp 98 10/25/22 1744   Pulse 87 10/25/22 1744   Resp 16 10/25/22 1744   SpO2 98 10/25/22 1744       [facility] AN Post Evaluation:   Patient Evaluated in floor  Patient Participation: complete - patient participated  Level of Consciousness: awake and alert  Pain Score: 0  Pain Management: satisfactory to patient  Airway Patency:patent  Yes    Cardiovascular Status: hemodynamically stable  Respiratory Status: nonlabored ventilation, spontaneous ventilation and room air  Postoperative Hydration stable      Cris Rubinstein, DO  10/26/2022 7:41 AM

## 2022-10-26 NOTE — LACTATION NOTE
This note was copied from a baby's chart. LACTATION NOTE - INFANT    Evaluation Type  Evaluation Type: Inpatient    Problems & Assessment  Problems Diagnosed or Identified: Shallow latch  Infant Assessment: Hunger cues present;Skin color: pink or appropriate for ethnicity  Muscle tone: Appropriate for GA    Feeding Assessment  Summary Current Feeding: Adlib;Breastfeeding exclusively  Breastfeeding Assessment: Calm and ready to breastfeed;Assisted with breastfeeding w/mother's permission;Deep latch achieved and observed; Coordinated suck/swallow  Breastfeeding Positions: cradle;cross cradle  Latch: Grasps breast, tongue down, lips flanged, rhythmic sucking  Audible Sucks/Swallows: Spontaneous and intermittent (24 hours old)  Type of Nipple: Everted (after stimulation)  Comfort (Breast/Nipple): Filling, red/small blisters/bruises, mild/mod discomfort  Hold (Positioning): Full assist, teach one side, mother does other, staff holds  [facility]: 8  Other (comment): Assisted with positioning for deeper latch in cc hold.   Reviewed optimal positioning

## 2022-10-26 NOTE — DISCHARGE INSTRUCTIONS
Pelvic rest for 6 weeks: nothing in the vagina (tampons, intercourse). May shower, no bathtubs or swimming.    - Call OB for any concerns:     *Heavy bleeding that saturates one pad per hour for several hours/clots bigger than a golf ball     *Signs of preeclampsia (headache, vision changes, nausea/vomiting)     *Increased pain unrelieved by oral medications     *Anxiety or depression     *Breast concerns     *Fever 100.4 or greater     *Dizziness/fainting     *Calf pain, redness or swelling    Follow up with OB in 6 weeks.

## 2022-10-27 VITALS
TEMPERATURE: 98 F | SYSTOLIC BLOOD PRESSURE: 123 MMHG | WEIGHT: 172 LBS | BODY MASS INDEX: 26.07 KG/M2 | OXYGEN SATURATION: 94 % | HEART RATE: 74 BPM | DIASTOLIC BLOOD PRESSURE: 75 MMHG | RESPIRATION RATE: 16 BRPM | HEIGHT: 68 IN

## 2022-10-27 NOTE — LACTATION NOTE
This note was copied from a baby's chart. LACTATION NOTE - INFANT    Evaluation Type  Evaluation Type: Inpatient    Problems & Assessment  Problems Diagnosed or Identified: Shallow latch  Problems: comment/detail: hx ob sib w/ tt, very painful latch, not satisfied after feeds  Infant Assessment: Hunger cues present;Skin color: pink or appropriate for ethnicity  Muscle tone: Appropriate for GA    Feeding Assessment  Summary Current Feeding: Adlib;Breastfeeding with breast milk supplement;Breastfeeding with formula supplement  Breastfeeding Assessment: Assisted with breastfeeding w/mother's permission;Sustained nutritive latch using nipple shield  Breastfeeding lasted # of minutes: 10  Breastfeeding Positions: right breast  Latch: Repeated attempts, hold nipple in mouth, stimulate to suck  Audible Sucks/Swallows: Spontaneous and intermittent (24 hours old)  Type of Nipple: Everted (after stimulation)  Comfort (Breast/Nipple): Filling, red/small blisters/bruises, mild/mod discomfort  Hold (Positioning): Full assist, teach one side, mother does other, staff holds  LATCH Score: 7         Pre/Post Weights  Supplement Type: EBM  Supplement total, ml: 5    Equipment used  Equipment used: Nipple Shield; Bottle with slow flow nipple  Nipple shield size: 24 mm

## 2022-10-27 NOTE — LACTATION NOTE
LACTATION NOTE - MOTHER           Problems identified  Problems identified: Knowledge deficit; Nipple pain; Nipple trauma;Milk supply not WNL  Milk supply not WNL: Reduced (potential)  Problems Identified Other: suppelemetning and not pumping    Maternal history  Maternal history: AMA; Induction of labor  Other/comment: IVF pregnancy    Breastfeeding goal  Breastfeeding goal: To maintain breast milk feeding per patient goal    Maternal Assessment  Bilateral Breasts: Symmetrical;Soft  Bilateral Nipples: Sore;Pink;Large  Prior breastfeeding experience (comment below): Multip;Problems, continued  Prior BF experience: comment: TT revision at 4 weeks, continued triple feeding for 8 months  Breastfeeding Assistance: Breastfeeding assistance provided with permission    Pain assessment  Pain, additional: Pain w/initial sucks only  Pain Location: Nipples  Treatment of Sore Nipples: Deeper latch techniques; Lanolin and breast shells during the day, hydrogel dressing at night;Hydrogel dressings as directed    Guidelines for use of:  Equipment: Hydrogel dressings;Breast shells; Nipple shield  Breast pump type: Spectra  Current use of pump[de-identified] pt given hand pump  Suggested use of pump: Pump each time a supplement is offered;Pump if infant is not latching to breast;Pump after nursing if a nipple shield is used  Other (comment): Pt reports latching very painful and that supplementation was started last night due to concerns infant not getting enough. Assisted with latch in laid back position and mom in a lot of pain so utilized 24 mm shield and mom reports pain is better. Infant sustained latch for 10 min with swallows heard. Mom very sore so agreed to use hand pump on both breasts for stimulation in leiu of latching infant on left. Mom obtained 5 mls which was bottle fed to infant. Encouraged mom to latch when she is able and supplement with formula/ebm after, and to pump if she is not going to latch for 15 min.  Provided gel pads, shells, and d/c teaching. Opv 11/3 @ 1100 am. Encouraged to call for concerns sooner if they arise.

## 2022-10-27 NOTE — PLAN OF CARE
Sat with patient to review plan of care. Discussed analgesic options and answered all questions. VSS. Breastfeeding on demand. Breastfeeding successfully. Voiding independently. Lochia is WNL. Uterus is firm. Problem: Patient Centered Care  Goal: Patient preferences are identified and integrated in the patient's plan of care  Description: Interventions:  - What would you like us to know as we care for you?   - Provide timely, complete, and accurate information to patient/family  - Incorporate patient and family knowledge, values, beliefs, and cultural backgrounds into the planning and delivery of care  - Encourage patient/family to participate in care and decision-making at the level they choose  - Honor patient and family perspectives and choices  Outcome: Progressing     Problem: POSTPARTUM  Goal: Long Term Goal:Experiences normal postpartum course  Description: INTERVENTIONS:  - Assess and monitor vital signs and lab values. - Assess fundus and lochia. - Provide ice/sitz baths for perineum discomfort. - Monitor healing of incision/episiotomy/laceration, and assess for signs and symptoms of infection and hematoma. - Assess bladder function and monitor for bladder distention.  - Provide/instruct/assist with pericare as needed. - Provide VTE prophylaxis as needed. - Monitor bowel function.  - Encourage ambulation and provide assistance as needed. - Assess and monitor emotional status and provide social service/psych resources as needed. - Utilize standard precautions and use personal protective equipment as indicated. Ensure aseptic care of all intravenous lines and invasive tubes/drains.  - Obtain immunization and exposure to communicable diseases history. Outcome: Progressing  Goal: Optimize infant feeding at the breast  Description: INTERVENTIONS:  - Initiate breast feeding within first hour after birth. - Monitor effectiveness of current breast feeding efforts.   - Assess support systems available to mother/family.  - Identify cultural beliefs/practices regarding lactation, letdown techniques, maternal food preferences. - Assess mother's knowledge and previous experience with breast feeding.  - Provide information as needed about early infant feeding cues (e.g., rooting, lip smacking, sucking fingers/hand) versus late cue of crying.  - Discuss/demonstrate breast feeding aids (e.g., infant sling, nursing footstool/pillows, and breast pumps). - Encourage mother/other family members to express feelings/concerns, and actively listen. - Educate father/SO about benefits of breast feeding and how to manage common lactation challenges. - Recommend avoidance of specific medications or substances incompatible with breast feeding.  - Assess and monitor for signs of nipple pain/trauma. - Instruct and provide assistance with proper latch. - Review techniques for milk expression (breast pumping) and storage of breast milk. Provide pumping equipment/supplies, instructions and assistance, as needed. - Encourage rooming-in and breast feeding on demand.  - Encourage skin-to-skin contact. - Provide LC support as needed. - Assess for and manage engorgement. - Provide breast feeding education handouts and information on community breast feeding support. Outcome: Progressing  Goal: Establishment of adequate milk supply with medication/procedure interruptions  Description: INTERVENTIONS:  - Review techniques for milk expression (breast pumping). - Provide pumping equipment/supplies, instructions, and assistance until it is safe to breastfeed infant. Outcome: Progressing  Goal: Experiences normal breast weaning course  Description: INTERVENTIONS:  - Assess for and manage engorgement. - Instruct on breast care. - Provide comfort measures. Outcome: Progressing  Goal: Appropriate maternal -  bonding  Description: INTERVENTIONS:  - Assess caregiver- interactions.   - Assess caregiver's emotional status and coping mechanisms. - Encourage caregiver to participate in  daily care. - Assess support systems available to mother/family.  - Provide /case management support as needed.   Outcome: Progressing

## 2022-11-03 ENCOUNTER — NURSE ONLY (OUTPATIENT)
Dept: LACTATION | Facility: HOSPITAL | Age: 42
End: 2022-11-03
Attending: OBSTETRICS & GYNECOLOGY
Payer: COMMERCIAL

## 2022-11-03 DIAGNOSIS — O92.4 HYPOGALACTIA WITH INFANT BREAST ATTACHMENT DIFFICULTY: Primary | ICD-10-CM

## 2022-11-03 DIAGNOSIS — O92.29 SORE NIPPLES DUE TO LACTATION: ICD-10-CM

## 2022-11-03 PROCEDURE — 99214 OFFICE O/P EST MOD 30 MIN: CPT

## 2022-11-03 NOTE — PATIENT INSTRUCTIONS
[facility] RN, BSN, [place]  [phone]      Naked Weight: 7 lb 8.8 oz, above birthweight at 5days of age, Son Pena!! Recommendations based on today's evaluation:    FEEDING PLAN    Breastfeeding frequency: Offer breast if/when ready using deep latch techniques described below. Upon assessment, oral restrictions are suspected. Consider further evaluation/assessment by speech therapy as discussed for additional supportive exercises. Supplementation: Continue to feed every 2.5-4 hours, 2 - 3 oz is recommended over the first month with continued gradual increase to satiety. Use expressed breast milk if available, if not formula is recommended. Pumping: Continue to pump with each feeding when able if plan to continue lactation journey, consider use of Mother Love herbal agents (non-fenugreek) for milk supply increase support. Festus  can have an impact on lowering blood sugar. Follow up: Update lactation as you continue on your journey, reach out as needed for additional guidance as needed. Reward all your wonderful efforts and give permission to yourself to make the right decision if it is needed to be made. We will support and help anywhere you are in your journey!!    Breastfeeding suggestions if/when supplements are needed    Kangaroo mother care: Snuggle your baby between your breasts in just a diaper and covered with a blanket. Helps to wake a sleepy baby and increases your milk supply. Massage your breasts before nursing or pumping. Breastfeed with hunger cues, most babies will breastfeed 8-12 times every 24 hours with some cluster feeding, especially during growth spurts. Gently wake by 2-3 hours to feed if sleepy. Positioning: Your hand at neck/shoulders, not the back of head. Line chin with the bottom of your areola    Latching on:  Express drops of milk onto your baby's lips to encourage licking.   Point your nipple to baby's nose  Stroke nipple lightly down center of lips  Wait for wide mouth with tongue cupped at bottom of mouth. Chin should be deep into breast, with some room between nose and the breast.   If needed, gently draw chin down lower to deepen latch. Is baby taking enough breastmilk? Swallowing with most sucks (every 1-3 sucks) until satisfied at least 8-12 times every 24 hours. (LASTING 15-30 MINUTES BETWEEN BOTH BREASTS)  Compressing the breast when your baby sucks can increase milk flow. At least 6-8 wet diapers and at least 3-4 soft, yellow seedy stools every 24 hours. Use breastfeeding journal.  Weight gain of at least 5-7 ounces per week    Supplementation:         If your baby is not meeting these guidelines for adequate breastfeeding, feed 1-2 oz or more expressed milk or formula with a wide based, slow flow nipple. Increase the amount of supplement until infant is having an adequate output    Paced bottle feeding using a slow flow nipple:   Hold your baby in an upright position, supporting the hand and neck with your hand, rather than in the crook of your arm. Let you baby \"latch on\" to bottle: stroke nipple down from top lip to bottom, licking is good, wait for wide mouth, tongue cupped at bottom of mouth. Tip the bottle up just far enough that there is not air in the nipple. Pausing mimics breastfeeding and discourages \"guzzling\" the feeding, allowing infant to take at least 15 minutes to drink the breastmilk or formula. Milk Supply Increase strategies:  Continue to massage your breasts before nursing and/or pumping and provide skin to skin contact with your baby as much as possible. Pump both breasts for 15-20 minutes every 2-3 hours after nursing. (at least 8x/24 hours). If milk supply is low and not responding to above measures within the week:  Discuss use of herbs such as fenugreek with your OB physician and baby's physician.  Review contraindications for the the use of herbal agents with lactation consultant prior to the start. Discuss thyroid check with OB physician     To care for nipples until healed:   If too sore to nurse on one or both breasts, pump one (or both) breast(s) to comfort every 3 hours. If nursing to contentment on one breast, this pumped milk can be stored for future use. If not nursing on either breast, feed baby your breast milk until able to return to breast.   Express drops of breast milk on nipples before and after nursing (unless nipple thrush is present). Use a hydrogel type dressing on your nipples between feedings. (Soothies or Ameda ComfortGel pads)  Discuss use of all purpose nipple ointment with your OB doctor. Call doctor if nipple has signs of infection: red/deep pink, drainage (pus), increased pain, fever. Watch for signs of yeast - see handout, \"Breastfeeding Suggestions for Possible Nipple/Breast Yeast (thrush)\"    Prevent plugged ducts and mastitis  Watch for signs of breast infection (mastitis) - painful breast, reddened area, fever, chills or flu-like symptoms - call your OB doctor at once if this occurs. Follow-up:  Call the [facility] at [phone] with any breastfeeding/pumping questions as needed  Call your pediatrician if any concerns develop  Keep your appointment with baby's doctor as planned        Call you baby's doctor with questions as well. Weight check sooner if wet or stool diapers decrease. Have weight checked again within 1-3 days of decreasing/stopping supplements. For additional information: [employer]. org  Www.[employer]. [employer]  www.breastfeeding. org  Www.breastfeedingonline. com         The above resources can also guide on safe weaning if needed.

## 2022-11-03 NOTE — PROGRESS NOTES
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 Cristina Rowe reports hx of nipple pain/trauma while in hospital, supplemented prior to discharge, breast rest at home for nipple healing, Milk supply currently not meeting infant needs consistently with pumping using Spectra pump every 2.5-3 hours, volume ranges 1-2 oz. 70 ml collected at this session in lieu of breastfeeding. Cristina Rowe reports sibling hx of tongue tie with revision, triple feeding contined ~8 months with hx of insufficient milk supply, FOB hx of TT with release at age of 4. Cristina Rowe requested oral assessment of [place], function assessment not completed at breast. White coating on posterior tongue, high palate, upper lip blister (mild) present upon visual assessment, digital assessment concludes shallow latch with accessory muscle use for seal formation, chomping, minimal cupping/extension noted. Assisted with bottle feeding using Dr. Rubén Ruby glass bottle, leaking with feeding despite paced bottle feeding otherwise well tolerated. Maternal history  Other/comment: IVF pregnancy    Breastfeeding goal  Breastfeeding goal: To maintain breast milk feeding per patient goal    Maternal Assessment  Bilateral Breasts: Symmetrical;Soft  Bilateral Nipples: Large;Elastic;Everted  Prior breastfeeding experience (comment below): Multip;Problems, continued  Prior BF experience: comment: TT revision at 4 weeks, continued triple feeding for 8 months  Breastfeeding Assistance: [facility] assistance declined at this time (chose to pump only at this session)    Pain assessment  Location/Comment: pain from pumping: flange sizes checked. Guidelines for use of:  Breast pump type: Spectra  Current use of pump[de-identified] every 2-3 hours.   Suggested use of pump: For comfort as needed;Pump each time a supplement is offered;Pump if infant is not latching to breast  Reported pumping volumes (ml): 1-2  Post-feed pumped volume: 70 ml collected in lieu of breastfeeding today  Other (comment): Reviewed milk supply increase strategies and/or weaning completely.  Pt plan to update lactation with plan/needs

## 2022-11-25 ENCOUNTER — TELEPHONE (OUTPATIENT)
Dept: OBGYN UNIT | Facility: HOSPITAL | Age: 42
End: 2022-11-25

## 2022-12-13 ENCOUNTER — POSTPARTUM (OUTPATIENT)
Dept: OBGYN CLINIC | Facility: CLINIC | Age: 42
End: 2022-12-13
Payer: COMMERCIAL

## 2022-12-13 ENCOUNTER — TELEPHONE (OUTPATIENT)
Dept: OBGYN CLINIC | Facility: CLINIC | Age: 42
End: 2022-12-13

## 2022-12-13 VITALS
DIASTOLIC BLOOD PRESSURE: 77 MMHG | WEIGHT: 150 LBS | HEART RATE: 70 BPM | BODY MASS INDEX: 23 KG/M2 | SYSTOLIC BLOOD PRESSURE: 111 MMHG

## 2022-12-13 DIAGNOSIS — N39.3 SUI (STRESS URINARY INCONTINENCE, FEMALE): ICD-10-CM

## 2022-12-13 PROCEDURE — 3074F SYST BP LT 130 MM HG: CPT | Performed by: OBSTETRICS & GYNECOLOGY

## 2022-12-13 PROCEDURE — 3078F DIAST BP <80 MM HG: CPT | Performed by: OBSTETRICS & GYNECOLOGY

## 2022-12-13 NOTE — TELEPHONE ENCOUNTER
Pt states her period started this morning. States it is a medium flow. Pt advised to keep her pp appt.

## 2023-03-31 ENCOUNTER — TELEPHONE (OUTPATIENT)
Dept: OBGYN CLINIC | Facility: CLINIC | Age: 43
End: 2023-03-31

## 2023-03-31 NOTE — TELEPHONE ENCOUNTER
Pt called and informed of CHACHA recs, Pt scheduled for VV on 4/18/2023. Scribe Attestation (For Scribes USE Only)...

## 2023-03-31 NOTE — TELEPHONE ENCOUNTER
DO LAURENCE Estrada Em [facility] Ob/Gyne Clinical Staff  Caller: Unspecified (Today, 10:01 AM)  Yes.  If annual up to date okay for video visit

## 2023-03-31 NOTE — TELEPHONE ENCOUNTER
DO LAURENCE Grande Em [facility] Ob/Gyne Clinical Staff  Caller: Unspecified (Today, 10:01 AM)  Motrin 600mg q6h prn pain

## 2023-03-31 NOTE — TELEPHONE ENCOUNTER
Pt delivered in 10/2022. She got her first period in 12/2022. Pt states she does have a history of painful and heavy periods on days 2-3. Since she started her periods after delivery pt states days 2-3 are \"off the chart painful\". Pt rates the pain 8/10. Pt states the bleeding is also extremely heavy. Pt will soak through a super plus tampon every 1-2 hours. She is also passing pea to grape sized clots. Pt states she is sometimes stuck on the toilet due to the bleeding and clots. Pt states this has happened on days 2-3 of every period since 12/2022 like clockwork. Day 4 the bleeding slows to a medium/light flow. Pt has been taking 600mg of ibuprofen every 6 hours for pain. Pt states it does help but if she doesn't take it every 6 hours the pain is back to an 8/10. Pt denies any SOB, chest pains, heart palpitations or lightheadedness. Pt is having some slight nausea but thinks it may be due to the 600mg of ibuprofen. Pt advised to  Make sure to take it with food. Pt is currently on day 3 of this period. Pt informed that with bleeding that heavy and pain that severe we recommend going to the ER for eval.  Pt states she does not want to do that. Pt states tomorrow the bleeding will be lighter. Pt advised to go to the ER if she develops any of the symptoms above. Pt states after delivery in 10/2022 she did have some retained placenta but it was removed before she left the hospital.  Pt asked if it is possible she could still have more retained placenta. Pt informed that is unlikely since her periods are very regular and the heavy bleeding is consistently only on days 2-3. Pt denies a fever. Pt would like message sent to [facility] [employer]  for recs on controlling the heavy bleeding on days 2-3. Pt states she does not want to go on ocps. Pt informed CHACHA will be back in the office on Monday. Pt once again reminded to go to the ER with symptoms above. Pt expressed understanding.

## 2023-03-31 NOTE — TELEPHONE ENCOUNTER
Message to [address]. Pt Is taking 600mg of ibuprofen for pain. She is asking for a plan to stop the bleeding from being so heavy and the severe pain every cycle on days 2-3. Should pt make appt for consult?

## 2023-08-31 ENCOUNTER — OFFICE VISIT (OUTPATIENT)
Dept: OBGYN CLINIC | Facility: CLINIC | Age: 43
End: 2023-08-31

## 2023-08-31 VITALS
HEIGHT: 68 IN | BODY MASS INDEX: 23.82 KG/M2 | DIASTOLIC BLOOD PRESSURE: 76 MMHG | WEIGHT: 157.19 LBS | SYSTOLIC BLOOD PRESSURE: 110 MMHG | HEART RATE: 72 BPM

## 2023-08-31 DIAGNOSIS — Z12.4 SCREENING FOR CERVICAL CANCER: ICD-10-CM

## 2023-08-31 DIAGNOSIS — Z01.419 WELL WOMAN EXAM WITH ROUTINE GYNECOLOGICAL EXAM: Primary | ICD-10-CM

## 2023-08-31 DIAGNOSIS — N92.0 MENORRHAGIA WITH REGULAR CYCLE: ICD-10-CM

## 2023-08-31 PROCEDURE — 3074F SYST BP LT 130 MM HG: CPT | Performed by: OBSTETRICS & GYNECOLOGY

## 2023-08-31 PROCEDURE — 99396 PREV VISIT EST AGE 40-64: CPT | Performed by: OBSTETRICS & GYNECOLOGY

## 2023-08-31 PROCEDURE — 3008F BODY MASS INDEX DOCD: CPT | Performed by: OBSTETRICS & GYNECOLOGY

## 2023-08-31 PROCEDURE — 3078F DIAST BP <80 MM HG: CPT | Performed by: OBSTETRICS & GYNECOLOGY

## 2023-08-31 RX ORDER — NAPROXEN 500 MG/1
500 TABLET ORAL 2 TIMES DAILY WITH MEALS
COMMUNITY

## 2023-08-31 RX ORDER — KETOCONAZOLE 20 MG/ML
SHAMPOO TOPICAL
COMMUNITY
Start: 2023-01-13

## 2023-08-31 NOTE — PROGRESS NOTES
Justina Shah is a 38year old female [id number] Patient's last menstrual period was 08/10/2023 (approximate). Patient presents with:  Gyn Exam: ANNUAL EXAM  Presenting for well woman exam. Last pap smear was normal 2018. [place] [facility] [place] be [place] pelvic floor PT for stress incontinence in December. Monthly menses that are heavy with times changing super tampon every hour. Patient did 15 rounds of IVF; discussed option for BS for cancer reduction. OBSTETRICS HISTORY:  OB History     T2    L2    SAB0  IAB0  Ectopic0  Multiple0  Live Births2     GYNE HISTORY:  Patient's last menstrual period was 08/10/2023 (approximate).     Sexual activity:   Yes      Partners:   Male      Birth control/ protection:   Vasectomy        Pap Date: 21  Pap Result Notes: NEG PAP / NEG HPV      MEDICAL HISTORY:  Past Medical History:   Diagnosis Date    Anxiety     Asthma     Childhood    Cervical dysplasia     LEEP     Genetic disease     CF carrier; spouse negative    Herpes simplex     oral     History of abnormal cervical Pap smear     History of blood transfusion     Human papilloma virus infection     Infertility, female     Sinusitis     Recurrent sinus infections         SURGICAL HISTORY:  Past Surgical History:   Procedure Laterality Date    HYSTEROSCOPY      LEEP      OTHER SURGICAL HISTORY  2013    periacetabular osteotomy right hip    PERIACETABULAR OSTEOTOMY Right 2013       SOCIAL HISTORY:  Social History    Socioeconomic History      Marital status:       Spouse name: Not on file      Number of children: Not on file      Years of education: Not on file      Highest education level: Not on file    Occupational History      Not on file    Tobacco Use      Smoking status: Never      Smokeless tobacco: Never    Vaping Use      Vaping Use: Never used    Substance and Sexual Activity      Alcohol use: Yes        Comment: SOCIALLY      Drug use: No      Sexual activity: Yes        Partners: Male        Birth control/protection: Vasectomy    Other Topics      Concerns:        Not on file    Social History Narrative      Not on file    Social Determinants of Health  Financial Resource Strain: Not on file  Food Insecurity: Not on file  Transportation Needs: Not on file  Stress: Not on file  Housing Stability: Not on file      Depression Screening (PHQ-2/PHQ-9): Over the LAST 2 WEEKS   Little interest or pleasure in doing things (over the last two weeks)?: Not at all    Feeling down, depressed, or hopeless (over the last two weeks)?: Not at all    PHQ-2 SCORE: 0           MEDICATIONS:    Current Outpatient Medications:     ketoconazole 2 % External Shampoo, Apply to scalp in shower and let sit for 5 minutes. Repeat every other day, Disp: , Rfl:     naproxen 500 MG Oral Tab, Take 1 tablet (500 mg total) by mouth 2 (two) times daily with meals. , Disp: , Rfl:     albuterol 108 (90 Base) MCG/ACT Inhalation Aero Soln, Inhale 2 puffs into the lungs every 4 (four) hours as needed. , Disp: , Rfl:     Ergocalciferol (VITAMIN D OR), Take by mouth., Disp: , Rfl:     Docosahexaenoic Acid (DHA OMEGA 3 OR), Take by mouth., Disp: , Rfl:     Probiotic Product (PROBIOTIC-10 OR), Take by mouth., Disp: , Rfl:     prenatal vitamin w/DHA 27-0.8-228 MG Oral Cap, Take 1 capsule by mouth daily. (Patient not taking: Reported on 8/31/2023), Disp: , Rfl:     ALLERGIES:    Cat Hair Extract        HIVES, ITCHING      Review of Systems:  Review of Systems   All other systems reviewed and are negative. 08/31/23  1419   BP: 110/76   Pulse: 72       PHYSICAL EXAM:   Physical Exam  Vitals reviewed. Constitutional:       Appearance: Normal appearance. HENT:      Head: Atraumatic. Eyes:      Pupils: Pupils are equal, round, and reactive to light.    Pulmonary:      Effort: Pulmonary effort is normal.   Chest:   Breasts:     Right: Normal. No bleeding, inverted nipple, mass, nipple discharge, skin change or tenderness. Left: Normal. No bleeding, inverted nipple, mass, nipple discharge, skin change or tenderness. Abdominal:      General: Abdomen is flat. Palpations: Abdomen is soft. Tenderness: There is no abdominal tenderness. Genitourinary:     General: Normal vulva. Exam position: Lithotomy position. Labia:         Right: No rash, tenderness, lesion or injury. Left: No rash, tenderness, lesion or injury. Vagina: Normal.      Cervix: Normal.      Uterus: Normal. Not tender. Adnexa: Right adnexa normal and left adnexa normal.        Right: No tenderness or fullness. Left: No tenderness or fullness. Lymphadenopathy:      Upper Body:      Right upper body: No supraclavicular, axillary or pectoral adenopathy. Left upper body: No supraclavicular, axillary or pectoral adenopathy. Skin:     General: Skin is warm and dry. Neurological:      General: No focal deficit present. Mental Status: She is alert and oriented to person, place, and time. Psychiatric:         Mood and Affect: Mood normal.         Behavior: Behavior normal.         Thought Content: Thought content normal.         Judgment: Judgment normal.           Assessment & Plan:  Dede Santos was seen today for gyn exam.    Diagnoses and all orders for this visit:    Well woman exam with routine gynecological exam    Menorrhagia with regular cycle  -     US PELVIS W EV (CPT=76856/89057); Future    Screening for cervical cancer  -     THINPREP PAP SMEAR B; Future  -     HPV HIGH RISK , THIN PREP COLLECTION; Future  -     HPV HIGH RISK , THIN PREP COLLECTION  -     THINPREP PAP SMEAR B  -     THINPREP PAP ([facility])        Requested Prescriptions      No prescriptions requested or ordered in this encounter       Pap w/ HPV done. ASSCP guidelines reviewed. Annual exams encouraged. Call if any abnormal vaginal bleeding. Mammogram completed. SBE encouraged.  Undecided on bilateral salpingectomy at this time.

## 2023-09-01 LAB — HPV I/H RISK 1 DNA SPEC QL NAA+PROBE: NEGATIVE

## 2023-10-19 ENCOUNTER — IMMUNIZATION (OUTPATIENT)
Dept: LAB | Age: 43
End: 2023-10-19
Attending: EMERGENCY MEDICINE
Payer: COMMERCIAL

## 2023-10-19 DIAGNOSIS — Z23 NEED FOR VACCINATION: Primary | ICD-10-CM

## 2023-10-19 PROCEDURE — 90471 IMMUNIZATION ADMIN: CPT

## 2023-10-19 PROCEDURE — 90686 IIV4 VACC NO PRSV 0.5 ML IM: CPT

## 2023-11-27 ENCOUNTER — PATIENT MESSAGE (OUTPATIENT)
Dept: OBGYN CLINIC | Facility: CLINIC | Age: 43
End: 2023-11-27

## 2023-11-27 DIAGNOSIS — N39.3 SUI (STRESS URINARY INCONTINENCE, FEMALE): Primary | ICD-10-CM

## 2023-11-27 NOTE — TELEPHONE ENCOUNTER
From: Alli Beaulieu  To: Og Jeb  Sent: 2023 12:13 PM CST  Subject: Referral for pelvic floor PT    Hi Dr. Jennifer Bourne,    I hope you are well. I have an open PT order for pelvic floor but it expires 23. I have had a very hard time trying to schedule therapy over the past few months based on my life schedule abs the availability of therapists. I think I may have a schedule figured out now, but the order will  before I can schedule it. Would it be possible to write me a new order in the system? I appreciate your help.     Keri Escoto

## 2023-11-27 NOTE — TELEPHONE ENCOUNTER
Pt was referred to pelvic floor PT for ASH.  [facility] to please see pts message and if OK to place new order for pelvic floor PT?

## 2023-11-29 ENCOUNTER — HOSPITAL ENCOUNTER (OUTPATIENT)
Dept: ULTRASOUND IMAGING | Facility: HOSPITAL | Age: 43
Discharge: HOME | End: 2023-11-29
Attending: OBSTETRICS & GYNECOLOGY
Payer: COMMERCIAL

## 2023-11-29 DIAGNOSIS — N92.0 MENORRHAGIA WITH REGULAR CYCLE: ICD-10-CM

## 2023-11-29 PROCEDURE — 76830 TRANSVAGINAL US NON-OB: CPT | Performed by: OBSTETRICS & GYNECOLOGY

## 2023-11-29 PROCEDURE — 76856 US EXAM PELVIC COMPLETE: CPT | Performed by: OBSTETRICS & GYNECOLOGY

## 2023-12-04 ENCOUNTER — TELEPHONE (OUTPATIENT)
Dept: PHYSICAL THERAPY | Age: 43
End: 2023-12-04

## 2023-12-04 NOTE — TELEPHONE ENCOUNTER
Called pt from wait list to offer sooner appts with Leannezohra Medina. She declined at this time due to construction at her home.  She confirmed her eval in Jan. She will call the scheduling line if she would like to be placed back on wait list.

## 2023-12-05 ENCOUNTER — PATIENT MESSAGE (OUTPATIENT)
Dept: OBGYN CLINIC | Facility: CLINIC | Age: 43
End: 2023-12-05

## 2023-12-12 NOTE — TELEPHONE ENCOUNTER
Called patient and reviewed results from recent [address]. Plan will be for in office endometrial biopsy and she will call on day 1 of menses to schedule. Briefly reviewed treatment options such as IUD or ablation. She is concerned about ovarian cancer risk given history of IVF. Reviewed that at this time no indication for oophorectomy and reviewed risks of removal prior to menopause. She expressed understanding. We will discuss treatment options further at EMBx appointment and following results.

## 2024-01-11 ENCOUNTER — TELEPHONE (OUTPATIENT)
Dept: PHYSICAL THERAPY | Age: 44
End: 2024-01-11

## 2024-01-12 ENCOUNTER — APPOINTMENT (OUTPATIENT)
Dept: PHYSICAL THERAPY | Age: 44
End: 2024-01-12
Attending: OBSTETRICS & GYNECOLOGY
Payer: COMMERCIAL

## 2024-01-16 ENCOUNTER — TELEPHONE (OUTPATIENT)
Dept: PHYSICAL THERAPY | Facility: HOSPITAL | Age: 44
End: 2024-01-16

## 2024-01-19 ENCOUNTER — APPOINTMENT (OUTPATIENT)
Dept: PHYSICAL THERAPY | Age: 44
End: 2024-01-19
Attending: OBSTETRICS & GYNECOLOGY
Payer: COMMERCIAL

## 2024-01-29 ENCOUNTER — OFFICE VISIT (OUTPATIENT)
Dept: OBGYN CLINIC | Facility: CLINIC | Age: 44
End: 2024-01-29

## 2024-01-29 VITALS
HEART RATE: 80 BPM | BODY MASS INDEX: 24 KG/M2 | WEIGHT: 158 LBS | DIASTOLIC BLOOD PRESSURE: 86 MMHG | SYSTOLIC BLOOD PRESSURE: 134 MMHG

## 2024-01-29 DIAGNOSIS — N83.209 HEMORRHAGIC CYST OF OVARY: Primary | ICD-10-CM

## 2024-01-29 DIAGNOSIS — N83.202 OVARIAN CYST, LEFT: ICD-10-CM

## 2024-01-29 PROBLEM — Z98.890 HISTORY OF CERVICAL LEEP BIOPSY AFFECTING CARE OF MOTHER, ANTEPARTUM (HCC): Status: RESOLVED | Noted: 2022-03-24 | Resolved: 2024-01-29

## 2024-01-29 PROBLEM — U07.1 COVID-19 AFFECTING PREGNANCY IN THIRD TRIMESTER (HCC): Status: RESOLVED | Noted: 2022-09-30 | Resolved: 2024-01-29

## 2024-01-29 PROBLEM — O98.513 COVID-19 AFFECTING PREGNANCY IN THIRD TRIMESTER: Status: RESOLVED | Noted: 2022-09-30 | Resolved: 2024-01-29

## 2024-01-29 PROBLEM — Z98.890 HISTORY OF CERVICAL LEEP BIOPSY AFFECTING CARE OF MOTHER, ANTEPARTUM: Status: RESOLVED | Noted: 2022-03-24 | Resolved: 2024-01-29

## 2024-01-29 PROBLEM — O98.513 COVID-19 AFFECTING PREGNANCY IN THIRD TRIMESTER (HCC): Status: RESOLVED | Noted: 2022-09-30 | Resolved: 2024-01-29

## 2024-01-29 PROBLEM — O09.521 MULTIGRAVIDA OF ADVANCED MATERNAL AGE IN FIRST TRIMESTER: Status: RESOLVED | Noted: 2022-03-24 | Resolved: 2024-01-29

## 2024-01-29 PROBLEM — O34.40 HISTORY OF CERVICAL LEEP BIOPSY AFFECTING CARE OF MOTHER, ANTEPARTUM: Status: RESOLVED | Noted: 2022-03-24 | Resolved: 2024-01-29

## 2024-01-29 PROBLEM — Z34.90 PREGNANCY: Status: RESOLVED | Noted: 2022-10-23 | Resolved: 2024-01-29

## 2024-01-29 PROBLEM — O09.811 PREGNANCY RESULTING FROM IN VITRO FERTILIZATION IN FIRST TRIMESTER: Status: RESOLVED | Noted: 2022-03-24 | Resolved: 2024-01-29

## 2024-01-29 PROBLEM — U07.1 COVID-19 AFFECTING PREGNANCY IN THIRD TRIMESTER: Status: RESOLVED | Noted: 2022-09-30 | Resolved: 2024-01-29

## 2024-01-29 PROBLEM — Z30.2 REQUEST FOR STERILIZATION: Status: RESOLVED | Noted: 2022-08-08 | Resolved: 2024-01-29

## 2024-01-29 PROBLEM — O34.40 HISTORY OF CERVICAL LEEP BIOPSY AFFECTING CARE OF MOTHER, ANTEPARTUM (HCC): Status: RESOLVED | Noted: 2022-03-24 | Resolved: 2024-01-29

## 2024-01-29 PROBLEM — Z34.90 PREGNANCY (HCC): Status: RESOLVED | Noted: 2022-10-23 | Resolved: 2024-01-29

## 2024-01-29 PROBLEM — O09.521 MULTIGRAVIDA OF ADVANCED MATERNAL AGE IN FIRST TRIMESTER (HCC): Status: RESOLVED | Noted: 2022-03-24 | Resolved: 2024-01-29

## 2024-01-29 PROBLEM — O09.811 PREGNANCY RESULTING FROM IN VITRO FERTILIZATION IN FIRST TRIMESTER (HCC): Status: RESOLVED | Noted: 2022-03-24 | Resolved: 2024-01-29

## 2024-01-29 PROCEDURE — 3075F SYST BP GE 130 - 139MM HG: CPT | Performed by: OBSTETRICS & GYNECOLOGY

## 2024-01-29 PROCEDURE — 3079F DIAST BP 80-89 MM HG: CPT | Performed by: OBSTETRICS & GYNECOLOGY

## 2024-01-29 PROCEDURE — 99214 OFFICE O/P EST MOD 30 MIN: CPT | Performed by: OBSTETRICS & GYNECOLOGY

## 2024-01-29 NOTE — PROGRESS NOTES
Socorro Bocanegra is a 43 year old female  Patient's last menstrual period was 2024 (approximate).   Chief Complaint   Patient presents with    ER F/U     ER FOLLOW UP -- JMN pt. Went to ER due to severe RLQ pain -- told had a cyst. Very worried about ovarian cancer risk due to 15+ cycles of IVF. When saw JMN at annual, told to get embx done for very heavy periods -- did not due to partner w/ new job / . Partner w/ vasectomy. Tear-eyed about ovarian cancer risk. Tried to call Dr. Jeovany Sue's office & told to d/w w/ us.   . LMP     OBSTETRICS HISTORY:  OB History    Para Term  AB Living   2 2 2 0 0 2   SAB IAB Ectopic Multiple Live Births   0 0 0 0 2       GYNE HISTORY:  Periods regular monthly    History   Sexual Activity    Sexual activity: Yes    Partners: Male    Birth control/ protection: Vasectomy       MEDICAL HISTORY:  Past Medical History:   Diagnosis Date    Anxiety     Asthma     Childhood    Cervical dysplasia     LEEP     Genetic disease     CF carrier; spouse negative    Herpes simplex     oral     History of abnormal cervical Pap smear     History of blood transfusion     Human papilloma virus infection     Infertility, female     Sinusitis     Recurrent sinus infections     Past Surgical History:   Procedure Laterality Date    HYSTEROSCOPY      LEEP      OTHER SURGICAL HISTORY  2013    periacetabular osteotomy right hip    PERIACETABULAR OSTEOTOMY Right 2013     OB History    Para Term  AB Living   2 2 2 0 0 2   SAB IAB Ectopic Multiple Live Births   0 0 0 0 2        SOCIAL HISTORY:  Social History     Socioeconomic History    Marital status:      Spouse name: Not on file    Number of children: Not on file    Years of education: Not on file    Highest education level: Not on file   Occupational History    Not on file   Tobacco Use    Smoking status: Never    Smokeless tobacco: Never   Vaping Use    Vaping Use: Never used    Substance and Sexual Activity    Alcohol use: Yes     Comment: SOCIALLY    Drug use: No    Sexual activity: Yes     Partners: Male     Birth control/protection: Vasectomy   Other Topics Concern    Not on file   Social History Narrative    Not on file     Social Determinants of Health     Financial Resource Strain: Not on file   Food Insecurity: Not on file   Transportation Needs: Not on file   Physical Activity: Not on file   Stress: Not on file   Social Connections: Not on file   Housing Stability: Not on file       MEDICATIONS:    Current Outpatient Medications:     ketoconazole 2 % External Shampoo, Apply to scalp in shower and let sit for 5 minutes. Repeat every other day, Disp: , Rfl:     naproxen 500 MG Oral Tab, Take 1 tablet (500 mg total) by mouth 2 (two) times daily with meals., Disp: , Rfl:     albuterol 108 (90 Base) MCG/ACT Inhalation Aero Soln, Inhale 2 puffs into the lungs every 4 (four) hours as needed., Disp: , Rfl:     Ergocalciferol (VITAMIN D OR), Take by mouth., Disp: , Rfl:     Docosahexaenoic Acid (DHA OMEGA 3 OR), Take by mouth., Disp: , Rfl:     prenatal vitamin w/DHA 27-0.8-228 MG Oral Cap, Take 1 capsule by mouth daily. (Patient not taking: Reported on 8/31/2023), Disp: , Rfl:     Probiotic Product (PROBIOTIC-10 OR), Take by mouth., Disp: , Rfl:     ALLERGIES:    Allergies   Allergen Reactions    Cat Hair Extract HIVES and ITCHING         Review of Systems:  Constitutional:    denies fatigue, night sweats, hot flashes  Cardiovascular:   denies chest pain or palpitations  Respiratory:    denies shortness of breath  Gastrointestinal:   denies heartburn, abdominal pain, diarrhea or constipation  Genitourinary:    denies dysuria, incontinence, abnormal vaginal discharge, vaginal itching  Musculoskeletal:   denies back pain.  Skin/Breast:    denies any breast pain, lumps, or discharge.   Neurological:    denies headaches, extremity weakness or numbness.  Psychiatric:   denies depression or  anxiety.  Endocrine:     denies excessive thirst or urination.  Heme/Lymph:    denies history of anemia, easy bruising or bleeding.      PHYSICAL EXAM:   /86   Pulse 80   Wt 158 lb (71.7 kg)   LMP 2024 (Approximate)   BMI 24.02 kg/m²   Constitutional:   well developed, well nourished  Head/Face:  normocephalic  Abdomen:    soft, nontender, nondistended, no masses  Skin/Hair:   no unusual rashes or bruises  Extremities:   no edema, no cyanosis  Psychiatric:    oriented to time, place, person and situation. Appropriate mood and affect    Pelvic Exam:  External Genitalia:  normal appearance, hair distribution, and no lesions  Urethral Meatus:   normal in size, location, without lesions and prolapse  Bladder:    no fullness, masses or tenderness  Vagina:    normal appearance without lesions, no abnormal discharge  Cervix:    normal without tenderness on motion  Uterus:   normal in size, contour, position, mobility, without tenderness  Adnexa:   normal without masses or tenderness  Perineum:   normal  Anus:    no hemorroids   Lymph node:   no inguinal lymph nodes    US PELVIS W EV (CPT=76856/16705)    Result Date: 2023  PROCEDURE: US PELVIS W EV (CPT=76856/28439)  COMPARISON: None.  INDICATIONS: Menorrhagia with regular cycle  TECHNIQUE: Pelvic ultrasound using transabdominal and transvaginal technique.  A transvaginal scan was performed for further evaluation.  FINDINGS:    UTERUS:  ENDOMETRIUM: No fluid or mass in the endometrial canal.  MYOMETRIUM: Normal echogenicity.  No masses. Retroflexed uterus.  Nonspecific echogenic foci in the region of the lower uterine segment/cervix, which span approximately 1.5 x 0.9 x 1.1 cm in aggregate.  OVARIES AND ADNEXA:   RIGHT:  No suspicious solid or cystic mass. LEFT:  There is a complex 2.5 x 1.7 x 1.9 cm left ovarian cyst.  CUL-DE-SAC:  Normal.  No free fluid or mass.  OTHER:  Negative.  Bladder appears normal.   Patient Characteristics:       : 2       Para: 2 Summary:       Last Menstrual Period: 2023-11-22      Pelvis and Uterus:           Uterus Length: 11.15 cm          Uterus Height: 3.68 cm          Uterus Width: 5.49 cm          Endometrium Thickness: 0.77 cm      Findings:           Ovary:              Right Ovary Length: 3.50 cm             Right Ovary Height: 2.63 cm             Right Ovary Width: 2.87 cm             Left Ovary Length: 3.04 cm             Left Ovary Height: 2.13 cm             Left Ovary Width: 2.43 cm         CONCLUSION:  1. Retroflexed uterus.  Nonspecific echogenic foci/calcifications in the region of the lower uterine segment/cervix.  These likely relate to either a chronic/remote insult or postprocedural change. 2. Endometrium measures up to 0.8 cm thickness and appears homogeneous. 3. Complex 2.5 cm left ovarian cyst, which is favored to represent a physiologic hemorrhagic cyst. 4. Lesser incidental findings as above.   elm-remote  Dictated by (CST): Isai Ji MD on 11/30/2023 at 8:15 AM     Finalized by (CST): Isai Ji MD on 11/30/2023 at 8:18 AM           TRANSABDOMINAL AND TRANSVAGINAL PELVIC ULTRASOUND :  with bilateral ovarian Doppler:      Comparison: CT pelvis 1/27/2024  History: Right lower quadrant pain.  Right adnexal cyst.  LMP: 1/18/2024    FINDINGS:    TRANSABDOMINAL IMAGING:  Uterus:7.2 x 5.1 x 5.1 cm  Endometrium:21 mm  Right ovary:48 x 35 x 39 mm.    Left ovary: 41 x 26 x 27 mm.    TRANSVAGINAL IMAGING:    Uterus: Scattered small nabothian cysts with mild increased echogenicity.    Endometrial Thickness:    Right ovary: Size:45 x 33 x 40 mm  32 x 26 x 29 mm complex cyst with fluid debris level possible hemorrhagic cyst.    Left ovary: Size: 36 x 23 x 24 mm  23 x 16 x 20 mm complex cyst.    DOPPLER EVALUATION:  Bilateral ovarian evaluation is made with color-flow and duplex Doppler.  Arterial inflow and venous outflow duplex Doppler spectral waveforms appear within normal  limits.      IMPRESSION:    32 mm right adnexal complex cyst with fluid debris level likely representing hemorrhagic cyst.    23 mm left adnexal complex cyst.    No free fluid detected.    FINAL REPORT  Attending Radiologist:  John Wall MD  Date Signed Off:  01/27/2024 23:16  Procedure Note    John Wall MD - 01/27/2024  Formatting of this note might be different from the original.      TRANSABDOMINAL AND TRANSVAGINAL PELVIC ULTRASOUND :  with bilateral ovarian Doppler:      Comparison: CT pelvis 1/27/2024  History: Right lower quadrant pain.  Right adnexal cyst.  LMP: 1/18/2024    FINDINGS:    TRANSABDOMINAL IMAGING:  Uterus:7.2 x 5.1 x 5.1 cm  Endometrium:21 mm  Right ovary:48 x 35 x 39 mm.    Left ovary: 41 x 26 x 27 mm.    TRANSVAGINAL IMAGING:    Uterus: Scattered small nabothian cysts with mild increased echogenicity.    Endometrial Thickness:    Right ovary: Size:45 x 33 x 40 mm  32 x 26 x 29 mm complex cyst with fluid debris level possible hemorrhagic cyst.    Left ovary: Size: 36 x 23 x 24 mm  23 x 16 x 20 mm complex cyst.    DOPPLER EVALUATION:  Bilateral ovarian evaluation is made with color-flow and duplex Doppler.  Arterial inflow and venous outflow duplex Doppler spectral waveforms appear within normal limits.      IMPRESSION:    32 mm right adnexal complex cyst with fluid debris level likely representing hemorrhagic cyst.    23 mm left adnexal complex cyst.    No free fluid detected.    FINAL REPORT  Attending Radiologist:  John Wall MD  Date Signed Off:  01/27/2024 23:16  Exam End: 01/27/24 11:02 PM    Specimen Collected: 01/27/24 11:12 PM Last Resulted: 01/27/24 11:16 PM   Received From: [facility]  Result Received: 01/29/24  3:19 PM        Assessment & Plan:    Socorro was seen today for er f/u.    Diagnoses and all orders for this visit:    Hemorrhagic cyst of ovary    Ovarian cyst, left        Requested Prescriptions      No prescriptions requested or ordered  in this encounter       Reviewed finding of hemorrhagic right ovarian cyst -- no free fluid.  Chronic left ovarian cyst of 2.5 cm size.  Agree w/ JMN recs for embx  Once eval done, consider either ocps (if wishes for lighter periods / suppression of ovarian cysts / decrease ov cancer risk if used over 10 yrs) or Mirena IUD (Rx heavy period only)  Could also consider laparascopic salpingectomies for ovarian cancer suppresion by 70%.  Needs to discuss overall plan w/ own gyne.      Spent total time 45 minutes on obtaining history / chart review, evaluating patient / performing medically appropriate exam, discussing treatment options, counseling / educating, and completing documentation, coordinating care.

## 2024-02-09 ENCOUNTER — APPOINTMENT (OUTPATIENT)
Dept: PHYSICAL THERAPY | Age: 44
End: 2024-02-09
Attending: OBSTETRICS & GYNECOLOGY
Payer: COMMERCIAL

## 2024-02-16 ENCOUNTER — APPOINTMENT (OUTPATIENT)
Dept: PHYSICAL THERAPY | Age: 44
End: 2024-02-16
Attending: OBSTETRICS & GYNECOLOGY
Payer: COMMERCIAL

## 2024-02-23 ENCOUNTER — APPOINTMENT (OUTPATIENT)
Dept: PHYSICAL THERAPY | Age: 44
End: 2024-02-23
Attending: OBSTETRICS & GYNECOLOGY
Payer: COMMERCIAL

## 2024-03-01 ENCOUNTER — TELEPHONE (OUTPATIENT)
Dept: OBGYN CLINIC | Facility: CLINIC | Age: 44
End: 2024-03-01

## 2024-03-01 ENCOUNTER — APPOINTMENT (OUTPATIENT)
Dept: PHYSICAL THERAPY | Age: 44
End: 2024-03-01
Attending: OBSTETRICS & GYNECOLOGY

## 2024-03-07 ENCOUNTER — OFFICE VISIT (OUTPATIENT)
Dept: OBGYN CLINIC | Facility: CLINIC | Age: 44
End: 2024-03-07
Payer: COMMERCIAL

## 2024-03-07 VITALS
DIASTOLIC BLOOD PRESSURE: 79 MMHG | BODY MASS INDEX: 24 KG/M2 | SYSTOLIC BLOOD PRESSURE: 114 MMHG | HEART RATE: 73 BPM | WEIGHT: 157 LBS

## 2024-03-07 DIAGNOSIS — Z32.00 PREGNANCY EXAMINATION OR TEST, PREGNANCY UNCONFIRMED: Primary | ICD-10-CM

## 2024-03-07 DIAGNOSIS — N93.9 ABNORMAL UTERINE BLEEDING: ICD-10-CM

## 2024-03-07 LAB
CONTROL LINE PRESENT WITH A CLEAR BACKGROUND (YES/NO): YES YES/NO
KIT LOT #: NORMAL NUMERIC
PREGNANCY TEST, URINE: NEGATIVE

## 2024-03-07 PROCEDURE — 81025 URINE PREGNANCY TEST: CPT | Performed by: OBSTETRICS & GYNECOLOGY

## 2024-03-07 PROCEDURE — 3074F SYST BP LT 130 MM HG: CPT | Performed by: OBSTETRICS & GYNECOLOGY

## 2024-03-07 PROCEDURE — 3078F DIAST BP <80 MM HG: CPT | Performed by: OBSTETRICS & GYNECOLOGY

## 2024-03-07 PROCEDURE — 58100 BIOPSY OF UTERUS LINING: CPT | Performed by: OBSTETRICS & GYNECOLOGY

## 2024-03-07 RX ORDER — LEVONORGESTREL AND ETHINYL ESTRADIOL 0.1-0.02MG
1 KIT ORAL DAILY
Qty: 84 TABLET | Refills: 4 | Status: SHIPPED | OUTPATIENT
Start: 2024-03-07 | End: 2025-03-07

## 2024-03-07 RX ORDER — MULTIVITAMIN WITH IRON
250 TABLET ORAL
COMMUNITY

## 2024-03-07 NOTE — PROCEDURES
Endometrial Biopsy    Pre-Procedure Care:   Consent was obtained.  Procedure/risks were explained.  Questions were answered.  Correct patient was identified.  Correct side and site were confirmed.    Pregnancy Results: negative from urine test   Birth control method(s) used: vasectomy    Pre-Medications:    The patient was premedicated with Ibuprofen .    Description of Procedure:  Under satisfactory analgesia, the patient was prepped and draped in the dorsal lithotomy position.   A bivalve speculum was placed in the vagina and the cervix was prepped with Betadine solution.   Single tooth tenaculum placed at the 12 o'clock position.   The uterine cavity was sounded at 6 cm.   The endometrial cavity was curetted for pipelle tissue sampling, 2 passes.  Specimen was sent to pathology.   The single tooth tenaculum was removed.   Silver nitrate was applied at the site of tenaculum application   Good hemostasis was noted.  There were no complications.    There was no blood loss.      Discharge instructions were provided to the patient.    Visit Plan:  Reviewed OCP vs Mirena use. She would like OCP use to decrease risk of additional ovarian cysts. OCP Rx sent to pharmacy. She is still considering BS for ovarian cancer risk reduction.

## 2024-03-08 ENCOUNTER — APPOINTMENT (OUTPATIENT)
Dept: PHYSICAL THERAPY | Age: 44
End: 2024-03-08
Attending: OBSTETRICS & GYNECOLOGY

## 2024-03-15 ENCOUNTER — APPOINTMENT (OUTPATIENT)
Dept: PHYSICAL THERAPY | Age: 44
End: 2024-03-15
Attending: OBSTETRICS & GYNECOLOGY

## 2024-03-20 ENCOUNTER — TELEPHONE (OUTPATIENT)
Dept: PHYSICAL THERAPY | Facility: HOSPITAL | Age: 44
End: 2024-03-20

## 2024-03-21 NOTE — PROGRESS NOTES
PELVIC FLOOR EVALUATION:   Referring Physician: Dr. Quesada  Diagnosis: ASH (stress urinary incontinence, female) (N39.3       Date of order: 11/29/23 Date of Service: 3/22/2024     Patient verbally consented to be seen for PT evaluation and treatment  Precautions:  None    PATIENT SUMMARY   Socorro Bocanegra is a 43 year old female  who presents to therapy today with complaints of incontinence, nagging issues and states that when she was sick for 5 weeks she had not been able to control bladder.Pt states that she skin numbness on R hip area. Generalized pain on neck,low back. She also has issues with incomplete emptying    Pt describes pain level: current 2/10. Worst pain :4/10  Quality: intermittent, dull, and aching    How has your lifestyle/quality of life been changed because of this issue : pt states that she has been affected socially with the incontinence, poise pads     History of condition/Previous treatments :pt sates that she has been having issues 5 years ago, post partum. Pt states that she did have a LEEP procedure. Pt had no issues after LEEP. Pt  states that prenatal, no issues. No formal PT but did have chiropractor treatments for low back    Past medical history was reviewed with Socorro. Significant findings include   Past Medical History:   Diagnosis Date    Anxiety     Asthma (HCC)     Childhood    Cervical dysplasia     LEEP 2008    Genetic disease (HCC)     CF carrier; spouse negative    Herpes simplex     oral     History of abnormal cervical Pap smear     History of blood transfusion 2013    Human papilloma virus infection     Infertility, female     Sinusitis     Recurrent sinus infections          Safety questions:  Are you being hurt, frightened, demeaned, or taken advantage of by anyone at your home or in your life?  No      Have you recently had thoughts of hurting yourself?  No    Have you tried to hurt yourself in the past?  No    Socorro describes prior level of function pt  was having no issues before pregnancies with RE incontinence. She walks for exercises, peloton bike    Occupation/Activities: mother of 2    Pt goals include to be able to control and hold urine.      Obstetrical/Gynecological history:    Urogenital Surgical history:LEEP  Urodynamic / Diagnostic Tests: none      Pregnant Now: No  Method of contraceptions : vasectomy   2/ Para 2:5 and 17 mos  Delivery methods: vaginal delivery  Complications during pregnancy/delivery: 2 tears,induced, prolonged and short      Last menstrual period: 24  Painful periods :Yes and heavy  Vaginal dryness : Yes    Abdominal/Vaginal Pressure complaints:Yes    Organ falling out:No     Trouble feeling bowel bladder/ urge/fullness: No   Do you ever leak urine without knowing it?  Yes   No activity:    No  Nocturia: Yes  1x a day  Empty bladder just in case: Yes     URINARY HABITS    Stress Urinary Incontinence : Yes  Events associated with the onset of urinary complaints: cough/sneezing ,jumping, running  Amount of leakage: small-large      Urge Urinary Incontinence : No  Amount of leakage: NA   Triggers :  No       Urinary Frequency:    How often normal frequency     Urine Stream: normal,   Amount: good  Painful urination : No   Trouble emptying bladder: completely :yes  Post void dribble: No   Urine Stop test: No   Hovering: No   Recurrent bladder infections No   Blood in urine : No     ADDITIONAL INFO:    Pad use: poise   Change frequency: 2x  Fluid Intake: water intake:6 cups of water x8    Bladder irritants: coffee      BOWEL HABITS    Types of symptoms: Constipation   Frequency of bowel movements: 1x a day  Stool consistency: Hill Stool Scale: 3  Do you strain with defecation: No   Laxative/Stool softener use: Yes: 250 mg nature maid  Any trouble holding back gas :No    SEXUAL HEALTH STATUS    History of Sexual Abuse: No   Sexual [place] Status: Yes   Pain with initial and/or deep or pain after sexual activity  penetration: No ,   Pain lasting pain after sexual activity :No  Orgasm status: Yes   Pain with pelvic exam: No     Types of symptoms: stress incontinence, incomplete emptying, and nocturia ;pressure and constipation; anterior laxity      ASSESSMENT      Socorro presents to physical therapy evaluation with primary c/o incontinence, nagging issues and states that when she was sick for 5 weeks she had not been able to control bladder.Pt states that she skin numbness on R hip area. Generalized pain on neck,low back. She also has issues with incomplete emptying.   Current reported symptoms include: stress incontinence, incomplete emptying, and nocturia ;pressure and constipation; anterior laxity        Functional deficits include but are not limited to : pt states that she has been affected socially with the incontinence, poise pads   .     The results of the objective tests and measures show joint mobility, motor function, muscle performance, muscle endurance, range of motion, coordination, balance, and gait  involving the PFM  with  noted decreased strength, endurance and coordination. Minimal restrictions noted on layer 1-2 tone  noted on PF at this time as noted below, areas specified    PT will need to further assess   lumbopelvic region  and will revise goals accordingly.     Pt and PT discussed evaluation findings, pathology, POC and HEP.  Pt voiced understanding and performs HEP correctly without reported pain. Skilled Pelvic Physical Therapy is medically necessary to address the above impairments and reach functional goals.    OBJECTIVE:   Informed consent for internal pelvic evaluation given: Yes     Internal Examination     Pelvic clock assessment:WNL  in all points     Perineal observation : WNL  Contract : decreased: more posterior movement  Bear down ;WFL  Cough: descent    Mons pubis: WNL  Labia majora: WNL  Labia minora: WNL  Urethral meatus: WNL  Introitus: WNL  Perineal body: WNL      Internal  Palpation Left Right Comments   Superficial Transverse perineal minimal restriction minimal restriction    Bulbocavernosus minimal restriction minimal restriction    Ischiocavernosus WNL WNL    Deep Transverse Perineal minimal restriction minimal restriction    Compress Urethrae/Spinc. Urethrovaginalis   WNL WNL    Pubococcygeus/Pubovaginalis WNL WNL    Iliococcygeus WNL WNL    Obturator internus WNL WNL    Piriformis NT internally NT internally    Coccygeus Not tested Not tested         Pelvic Floor Muscle strength: (PERF= Power/Endurance/Reps/Fast) MMT: 3-/6/1/7  Accessory Muscle Use: adductors    Tissue Laxity Test:     Anterior Wall: Mod  Posterior Wall: WNL  Apical:  NT      PFDI-20: PFDI 20    Scores   POPDI 6:    37.5   CRAD 8:    31.25   PARVEEN 6:    58.33   Summary:    127.08       Today's Treatment and Response:   Patient education was provided on objective findings of external and internal evaluation and expectations with treatment outcomes. Educated on pelvic anatomy and function with diagrams and pelvic model, adequate hydration levels, and diaphragmatic breathing for PNS activation and pelvic floor relaxation , POP strategies, stephany diary to be filled x2 days  EXERCISES DONE:  Diaphragmatic breathing is sitting/supine/internal  DB with PFM coordination: external/internal  PFM relaxation in combo with stretches  Happy baby x1   child pose x discussed and note if with increased pressure  Patient was instructed in and issued a HEP for: above, copies given    Charges: PT Eval Moderate Complexity, neuro lorenzo x1 (10 mins), theraact x (8  mins)      Total Timed Treatment: 18 min     Total Treatment Time: 50 min     Based on clinical rationale and outcome measures, this evaluation involved Moderate Complexity decision making due to 3+ personal factors/comorbidities, 3 body structures involved/activity limitations, and evolving symptoms as noted above  PLAN OF CARE:    Goals: (to be met in 8-10 visits)    Pt will be  I with HEP,its progression and management of symptoms, biomechanical strategies and self correction of upright posturing and PFM facilitation to manage IAP with daily tasks  Patient will verbalize improved  understanding of  bladder/bowel habits ,bladder ,bladder retraining  and long term management  Patient will exhibit an increase ability  in isolated pelvic floor strength  with improved  PERF , with appropriate relaxation from  for improve pelvic brace /IAP management with ADLS  manage UI/ASH and prevent leakage during ADLS  Patient to report urinary frequency to every 2-4 hours to allow for independent ADLs  Patient reports a reduction in ASH 50% or better to decrease pad use from  to 0/ day to maintain skin integrity of vulva area.  Patient to utilize proper toileting posture to allow for decreased straining with voiding and BM  Pt will report decreased nocturia incidents  to 0 x a night to promote better sleeping pattern  Pt will demonstrate biomechanical strategies for managing IAP for safe performance of  daily, recreational  and work tasks, 75% of the time to avoid incontinence issues, decreased risk of laxity progression  Pt will demonstrate decreased muscular tone/tenderness on PFM  to minimal to  none in order to safely progress with PFM strengthening and stabilization as appropriate     Frequency / Duration: Patient will be seen for 1 x/week or a total of 8-10 visits over a 90 day period.  Treatment will include: Manual Therapy, Neuromuscular Re-education, Self-Care Home Management, Therapeutic Activities, Therapeutic Exercise, and Home Exercise Program instruction       Education or treatment limitation: None  Rehab Potential:good      Patient/Family/Caregiver was advised of these findings, precautions, and treatment options and has agreed to actively participate in planning and for this course of care.    Thank you for your referral. Please co-sign or sign and return this letter via fax as soon as  possible to [phone]. If you have any questions, please contact me at Dept: [phone]    Sincerely,  Electronically signed by therapist: Onelia OrnelasPT, DPT, CAPP-OB  Physician's certification required: Yes  I certify the need for these services furnished under this plan of treatment and while under my care.    X___________________________________________________ Date____________________    Certification From: 3/22/2024  To:6/20/2024

## 2024-03-22 ENCOUNTER — APPOINTMENT (OUTPATIENT)
Dept: PHYSICAL THERAPY | Age: 44
End: 2024-03-22
Attending: OBSTETRICS & GYNECOLOGY

## 2024-03-22 ENCOUNTER — OFFICE VISIT (OUTPATIENT)
Dept: PHYSICAL THERAPY | Age: 44
End: 2024-03-22
Attending: OBSTETRICS & GYNECOLOGY
Payer: COMMERCIAL

## 2024-03-22 DIAGNOSIS — N39.3 SUI (STRESS URINARY INCONTINENCE, FEMALE): Primary | ICD-10-CM

## 2024-03-22 PROCEDURE — 97530 THERAPEUTIC ACTIVITIES: CPT

## 2024-03-22 PROCEDURE — 97112 NEUROMUSCULAR REEDUCATION: CPT

## 2024-03-22 PROCEDURE — 97162 PT EVAL MOD COMPLEX 30 MIN: CPT

## 2024-03-29 ENCOUNTER — APPOINTMENT (OUTPATIENT)
Dept: PHYSICAL THERAPY | Age: 44
End: 2024-03-29
Attending: OBSTETRICS & GYNECOLOGY
Payer: COMMERCIAL

## 2024-03-29 ENCOUNTER — APPOINTMENT (OUTPATIENT)
Dept: PHYSICAL THERAPY | Age: 44
End: 2024-03-29
Attending: OBSTETRICS & GYNECOLOGY

## 2024-04-04 NOTE — PROGRESS NOTES
Dx: ASH (stress urinary incontinence, female) (N39.3             Insurance   PPO           Authorized  # visits by insurance  :  8-10    Expiration date  of Authorization:6/20/24   Eval date/latest PN:3/22/24  Initial POC# of visits: 8-10           POC cert date : 6/20/24  Authorizing Physician: Dr. Quesada    Fall Risk: standard         Precautions: none         Subjective: Pt states that she is trying to drink more, less leaking noted but has not really had a lot of coughing/sneezing episodes.Pt states that her frequency of voiding is okay, constipation is not an issues.  PAIN LEVEL:0/10  Assessment: external assessment done  IRD  WFL ,ROM and MMT WFL    MFR done with less restrictions so was able to start PFM contractions and TRA initiated  Informed consent for internal pelvic evaluation given: Yes      Objective:    l Palpation Left Right Comments   Superficial Transverse perineal WFL WFL     Bulbocavernosus WFL WFL     Ischiocavernosus WFL WFL     Deep Transverse Perineal WFL WFL     Compress Urethrae/Spinc. Urethrovaginalis    WNL WNL     Pubococcygeus/Pubovaginalis WNL WNL     Iliococcygeus WNL WNL     Obturator internus WNL WNL     Piriformis NT internally NT internally     Coccygeus Not tested Not tested          PELVIC HEALTH  Posture : sway back  Range Of Motion  Lumbar AROM screen: WFL,painful at end range    Palpation:     Adductors :WNL   Greater trochanter/Gluteal area :minimal restriction    Piriformis :minimal restriction   Paravertebrals: minimal restriction and moderate restriction     LE AROM screen: grossly WNL for B hip and knee major joints  painfree  Strength (MMT) 5/5 ANTONIO LE  grossly assessed except hip extensors at 4+/5      Transverse Abdominis: 3/5    Diastasis Recti: (finger width depth while contracted)  Above Umbilicus: 0  Umbilicus: 2  Below Umbilicus: 0      Goals:   goals addressed this day as noted above  Goals: (to be met in 8-10 visits)     Pt will be I with HEP,its progression  and management of symptoms, biomechanical strategies and self correction of upright posturing and PFM facilitation to manage IAP with daily tasks  Patient will verbalize improved  understanding of  bladder/bowel habits ,bladder ,bladder retraining  and long term management  Patient will exhibit an increase ability  in isolated pelvic floor strength  with improved  PERF , with appropriate relaxation from  for improve pelvic brace /IAP management with ADLS  manage UI/ASH and prevent leakage during ADLS  Patient to report urinary frequency to every 2-4 hours to allow for independent ADLs  Patient reports a reduction in ASH 50% or better to decrease pad use from  to 0/ day to maintain skin integrity of vulva area.  Patient to utilize proper toileting posture to allow for decreased straining with voiding and BM  Pt will report decreased nocturia incidents  to 0 x a night to promote better sleeping pattern  Pt will demonstrate biomechanical strategies for managing IAP for safe performance of  daily, recreational  and work tasks, 75% of the time to avoid incontinence issues, decreased risk of laxity progression  Pt will demonstrate decreased muscular tone/tenderness on PFM  to minimal to  none in order to safely progress with PFM strengthening and stabilization as appropriate     Plan:cont per POC, will assess previous treatment and continue with strengthening with TRA combination     Date: 4/5/24  TX#: 2/8-10 Date:               TX#: 3/ Date:              TX#: 4/ Date:               TX#: 5/   Date:   Tx#: 6/  PFDI   Theraex: TrA with PF  then hip adduction isometric with ball  5 secs x10    TrA /PFM with bridges x10                Manual:         NMRed:  see below        INTERNAL RELAXATION :intervaginal       Diaphragmatic breathing x5 reps    Diaphragmatic breathing with PFM coordination x5     facilitation  PFM long hold x 3 secs x5  PFM quick contraction   PFM with precontractions with  cough x3 reps   TRA  facilitation x3 reps       EXTERNAL Diaphragmatic breathing with PFM coordination x 2    PFM long hold x 3 secs x 2  reps : supine     TrA facilitation 5 secs x 3 reps  TrA with PF x 3  reps            Education: PT provided/reviewed education on coordination of diaphragmatic breathing and pelvic floor contraction  and knack/pelvic muscle brace with hand outs given        HEP See pt instructions tab for new HEP today  See tab and PFM contractions See pt instructions tab for new HEP today See pt instructions tab for new HEP today See pt instructions tab for new HEP today See pt instructions tab for new HEP today        Charges: man mob x 1 (15 mins), thereax x1 (10 mins), neuro lorenzo x2 (30 mins)       Total Timed Treatment: 55 min  Total Treatment Time: 55 min

## 2024-04-05 ENCOUNTER — OFFICE VISIT (OUTPATIENT)
Dept: PHYSICAL THERAPY | Age: 44
End: 2024-04-05
Attending: OBSTETRICS & GYNECOLOGY
Payer: COMMERCIAL

## 2024-04-05 ENCOUNTER — APPOINTMENT (OUTPATIENT)
Dept: PHYSICAL THERAPY | Age: 44
End: 2024-04-05
Attending: OBSTETRICS & GYNECOLOGY

## 2024-04-05 PROCEDURE — 97112 NEUROMUSCULAR REEDUCATION: CPT

## 2024-04-05 PROCEDURE — 97110 THERAPEUTIC EXERCISES: CPT

## 2024-04-05 PROCEDURE — 97140 MANUAL THERAPY 1/> REGIONS: CPT

## 2024-04-11 NOTE — PROGRESS NOTES
Dx: ASH (stress urinary incontinence, female) (N39.3             Insurance   PPO           Authorized  # visits by insurance  :  8-10    Expiration date  of Authorization:6/20/24   Eval date/latest PN:3/22/24  Initial POC# of visits: 8-10           POC cert date : 6/20/24  Authorizing Physician: Dr. Quesada    Fall Risk: standard         Precautions: none         Subjective:Pt states that she has been doing well. Less leaking noted with activities. Pt states that she has been doing HEP but not sure if she is doing them right  PAIN LEVEL:0/10  Assessment:   PT spent time with proprioception for PFM using swiss ball/mirror to assist pt's PFM contraction: will increase to 10 secs as able    Advanced Tra challenges with long lever progression    Objective:    lnternal Palpation Left Right Comments   Superficial Transverse perineal WFL WFL     Bulbocavernosus WFL WFL     Ischiocavernosus WFL WFL     Deep Transverse Perineal WFL WFL     Compress Urethrae/Spinc. Urethrovaginalis    WNL WNL     Pubococcygeus/Pubovaginalis WNL WNL     Iliococcygeus WNL WNL     Obturator internus WNL WNL     Piriformis NT internally NT internally     Coccygeus Not tested Not tested          PELVIC HEALTH  Posture : sway back  Range Of Motion  Lumbar AROM screen: WFL,painful at end range    Palpation:     Adductors :WNL   Greater trochanter/Gluteal area :minimal restriction    Piriformis :minimal restriction   Paravertebrals: minimal restriction and moderate restriction     LE AROM screen: grossly WNL for B hip and knee major joints  painfree  Strength (MMT) 5/5 ANTONIO LE  grossly assessed except hip extensors at 4+/5      Transverse Abdominis: 3/5    Diastasis Recti: (finger width depth while contracted)  Above Umbilicus: 0  Umbilicus: 2  Below Umbilicus: 0      Goals:   goals addressed this day as noted above  Goals: (to be met in 8-10 visits)     Pt will be I with HEP,its progression and management of symptoms, biomechanical strategies and self  correction of upright posturing and PFM facilitation to manage IAP with daily tasks  Patient will verbalize improved  understanding of  bladder/bowel habits ,bladder ,bladder retraining  and long term management  Patient will exhibit an increase ability  in isolated pelvic floor strength  with improved  PERF , with appropriate relaxation from  for improve pelvic brace /IAP management with ADLS  manage UI/ASH and prevent leakage during ADLS  Patient to report urinary frequency to every 2-4 hours to allow for independent ADLs  Patient reports a reduction in ASH 50% or better to decrease pad use from  to 0/ day to maintain skin integrity of vulva area.  Patient to utilize proper toileting posture to allow for decreased straining with voiding and BM  Pt will report decreased nocturia incidents  to 0 x a night to promote better sleeping pattern  Pt will demonstrate biomechanical strategies for managing IAP for safe performance of  daily, recreational  and work tasks, 75% of the time to avoid incontinence issues, decreased risk of laxity progression  Pt will demonstrate decreased muscular tone/tenderness on PFM  to minimal to  none in order to safely progress with PFM strengthening and stabilization as appropriate     Plan:cont per POC, will assess previous treatment and continue with strengthening with TRA combination     Date: 4/5/24  TX#: 2/8-10 Date:             4/12/24  TX#: 3/ Date:              TX#: 4/ Date:               TX#: 5/   Date:   Tx#: 6/  PFDI   Theraex: TrA with PF  then hip adduction isometric with ball  5 secs x10    TrA /PFM with bridges x10          SLR  x10    SL hip abd x10    TrA /PFM with bridges x10     Prone hip extension x10      Manual:  Informed consent for internal pelvic evaluation given: Yes    PFM :   MFR/internal vaginal work  Layers 1-3 focused on          NMRed:  see below        INTERNAL RELAXATION :intervaginal       Diaphragmatic breathing x5 reps    Diaphragmatic breathing with  PFM coordination x5     facilitation  PFM long hold x 3 secs x5  PFM quick contraction   PFM with precontractions with  cough x3 reps   TRA facilitation x3 reps       EXTERNAL Diaphragmatic breathing with PFM coordination x 2    PFM long hold x 3 secs x 2  reps : supine     TrA facilitation 5 secs x 3 reps  TrA with PF x 3  reps      Diaphragmatic breathing x 2 reps then on SB x2    Diaphragmatic breathing with PFM coordination x 2 in supine and on SB x2    PFM long hold x 10 secs x 2  reps : supine; SL then   seated on swiss ball     PFM quick contraction x 1- 2secs hold x10; supine / seated on SB       TrA facilitation 5 secs x 2 reps  TrA with PF x 2  reps    Supine and standing     PRECONTRACTIONS and PFM facilitation/posture awareness with cues for BIOMECHANICAL STRATEGIES     precontractions of PFM with cough x2; supine      sit to stand x 5 reps         Education: PT provided/reviewed education on coordination of diaphragmatic breathing and pelvic floor contraction  and knack/pelvic muscle brace with hand outs given        HEP See pt instructions tab for new HEP today  See tab and PFM contractions See pt instructions tab for new HEP today    See tab See pt instructions tab for new HEP today See pt instructions tab for new HEP today See pt instructions tab for new HEP today        Charges: thereax x1 (15 mins), neuro lorenzo x2 (30 mins)       Total Timed Treatment: 45 min  Total Treatment Time: 45 min

## 2024-04-12 ENCOUNTER — OFFICE VISIT (OUTPATIENT)
Dept: PHYSICAL THERAPY | Age: 44
End: 2024-04-12
Attending: OBSTETRICS & GYNECOLOGY
Payer: COMMERCIAL

## 2024-04-12 PROCEDURE — 97110 THERAPEUTIC EXERCISES: CPT

## 2024-04-12 PROCEDURE — 97112 NEUROMUSCULAR REEDUCATION: CPT

## 2024-04-18 NOTE — PROGRESS NOTES
Dx: ASH (stress urinary incontinence, female) (N39.3             Insurance   PPO           Authorized  # visits by insurance  :  8-10    Expiration date  of Authorization:6/20/24   Eval date/latest PN:3/22/24  Initial POC# of visits: 8-10           POC cert date : 6/20/24  Authorizing Physician: Dr. Quesada    Fall Risk: standard         Precautions: none         Subjective:pt states that she is doing better.pt states that she ran trying to  daughter and did not have issues. Pt states that she is still wearing liner, but not as soaked  PAIN LEVEL:0/10  Assessment:   Pt kept the same level of TRA exercises and worked more on precontractions with below activiites  Objective:    lnternal Palpation Left Right Comments   Superficial Transverse perineal WFL WFL     Bulbocavernosus WFL WFL     Ischiocavernosus WFL WFL     Deep Transverse Perineal WFL WFL     Compress Urethrae/Spinc. Urethrovaginalis    WNL WNL     Pubococcygeus/Pubovaginalis WNL WNL     Iliococcygeus WNL WNL     Obturator internus WNL WNL     Piriformis NT internally NT internally     Coccygeus Not tested Not tested          PELVIC HEALTH  Posture : sway back  Range Of Motion  Lumbar AROM screen: WFL,painful at end range    Palpation:     Adductors :WNL   Greater trochanter/Gluteal area :minimal restriction    Piriformis :minimal restriction   Paravertebrals: minimal restriction and moderate restriction     LE AROM screen: grossly WNL for B hip and knee major joints  painfree  Strength (MMT) 5/5 ANTONIO LE  grossly assessed except hip extensors at 4+/5      Transverse Abdominis: 3/5    Diastasis Recti: (finger width depth while contracted)  Above Umbilicus: 0  Umbilicus: 2  Below Umbilicus: 0      Goals:   goals addressed this day as noted above  Goals: (to be met in 8-10 visits)     Pt will be I with HEP,its progression and management of symptoms, biomechanical strategies and self correction of upright posturing and PFM facilitation to manage IAP with  daily tasks  Patient will verbalize improved  understanding of  bladder/bowel habits ,bladder ,bladder retraining  and long term management  Patient will exhibit an increase ability  in isolated pelvic floor strength  with improved  PERF , with appropriate relaxation from  for improve pelvic brace /IAP management with ADLS  manage UI/ASH and prevent leakage during ADLS  Patient to report urinary frequency to every 2-4 hours to allow for independent ADLs  Patient reports a reduction in ASH 50% or better to decrease pad use from  to 0/ day to maintain skin integrity of vulva area.  Patient to utilize proper toileting posture to allow for decreased straining with voiding and BM  Pt will report decreased nocturia incidents  to 0 x a night to promote better sleeping pattern  Pt will demonstrate biomechanical strategies for managing IAP for safe performance of  daily, recreational  and work tasks, 75% of the time to avoid incontinence issues, decreased risk of laxity progression  Pt will demonstrate decreased muscular tone/tenderness on PFM  to minimal to  none in order to safely progress with PFM strengthening and stabilization as appropriate     Plan:cont per POC, will assess previous treatment and continue with strengthening with TRA combination     Date: 4/5/24  TX#: 2/8-10 Date:             4/12/24  TX#: 3/ Date:            4/19/24  TX#: 4/ Date:               TX#: 5/   Date:   Tx#: 6/  PFDI   Theraex: TrA with PF  then hip adduction isometric with ball  5 secs x10    TrA /PFM with bridges x10          SLR  x10    SL hip abd x10    TrA /PFM with bridges x10     Prone hip extension x10 SLR  x10 x2    SL hip abd x10 x2    TrA /PFM with bridges x10     Prone hip extension x10x2     Manual:  Informed consent for internal pelvic evaluation given: Yes    PFM :   MFR/internal vaginal work  Layers 1-3 focused on          NMRed:  see below        INTERNAL RELAXATION :intervaginal       Diaphragmatic breathing x5  reps    Diaphragmatic breathing with PFM coordination x5     facilitation  PFM long hold x 3 secs x5  PFM quick contraction   PFM with precontractions with  cough x3 reps   TRA facilitation x3 reps       EXTERNAL Diaphragmatic breathing with PFM coordination x 2    PFM long hold x 3 secs x 2  reps : supine     TrA facilitation 5 secs x 3 reps  TrA with PF x 3  reps      Diaphragmatic breathing x 2 reps then on SB x2    Diaphragmatic breathing with PFM coordination x 2 in supine and on SB x2    PFM long hold x 10 secs x 2  reps : supine; SL then   seated on swiss ball     PFM quick contraction x 1- 2secs hold x10; supine / seated on SB       TrA facilitation 5 secs x 2 reps  TrA with PF x 2  reps    Supine and standing     PRECONTRACTIONS and PFM facilitation/posture awareness with cues for BIOMECHANICAL STRATEGIES     precontractions of PFM with cough x2; supine      sit to stand x 5 reps    DB x PFM  PFM long hold x 10 secs x 5  reps : supine    PRECONTRACTIONS and PFM facilitation/posture awareness with cues for BIOMECHANICAL STRATEGIES     precontractions of PFM with cough x2; supine   ball throws to trampoline 4# 2x10    Step ups 6\" 2x10    TRA     Education: PT provided/reviewed education on coordination of diaphragmatic breathing and pelvic floor contraction  and knack/pelvic muscle brace with hand outs given        HEP See pt instructions tab for new HEP today  See tab and PFM contractions See pt instructions tab for new HEP today    See tab See pt instructions tab for new HEP today See pt instructions tab for new HEP today See pt instructions tab for new HEP today        Charges: thereax x1 (15 mins), neuro lorenzo x2 (30 mins)       Total Timed Treatment: 45 min  Total Treatment Time: 45 min

## 2024-04-19 ENCOUNTER — OFFICE VISIT (OUTPATIENT)
Dept: PHYSICAL THERAPY | Age: 44
End: 2024-04-19
Attending: OBSTETRICS & GYNECOLOGY
Payer: COMMERCIAL

## 2024-04-19 PROCEDURE — 97110 THERAPEUTIC EXERCISES: CPT

## 2024-04-19 PROCEDURE — 97112 NEUROMUSCULAR REEDUCATION: CPT

## 2024-04-25 NOTE — PROGRESS NOTES
Dx: ASH (stress urinary incontinence, female) (N39.3             Insurance   PPO           Authorized  # visits by insurance  :  8-10    Expiration date  of Authorization:6/20/24   Eval date/latest PN:3/22/24  Initial POC# of visits: 8-10           POC cert date : 6/20/24  Authorizing Physician: Dr. Quesada    Fall Risk: standard         Precautions: none         Subjective:Pt states that she continues to with incontinence but better mostly with unexpected coughing/sneezing.  Pt states that she is taking new birth control and now has her period and has some issues with keeping and putting tampon in  PAIN LEVEL:0/10  Assessment: PT progressed pt from DB and DB/PF coordination with TrA facilitation with LE movements ,working towards goals of managing IAP/pelvic brace/loaded position    Additional HEP given to continue with progress gained in therapy. Tactile,verbal and written cues given for proper performance. Pt voiced understanding and performs HEP which  correctly without reported pain.Instructions were provided on how to modify as need or when to stop.     Objective:      Leaking improved by: 25 %  Stress incontinence:  Yes   Amount of leakage: coughs  Pad use: panty shields   Pad Change frequency: 1-2 when out of the house    Trouble emptying bladder: completely :No   Post void dribble: No   Nocturia: No usually 4x a week  Triggers :  No   Fluid Intake: water intake is bowel    BOWEL HABITS  Types of symptoms:  normal  SEXUAL HEALTH STATUS      no issues        lnternal Palpation Left Right Comments   Superficial Transverse perineal WFL WFL     Bulbocavernosus WFL WFL     Ischiocavernosus WFL WFL     Deep Transverse Perineal WFL WFL     Compress Urethrae/Spinc. Urethrovaginalis    WNL WNL     Pubococcygeus/Pubovaginalis WNL WNL     Iliococcygeus WNL WNL     Obturator internus WNL WNL     Piriformis NT internally NT internally     Coccygeus Not tested Not tested          PELVIC HEALTH  Posture : sway back  Range  Of Motion  Lumbar AROM screen: WFL,painful at end range    Palpation:     Adductors :WNL   Greater trochanter/Gluteal area :minimal restriction    Piriformis :minimal restriction   Paravertebrals: minimal restriction and moderate restriction     LE AROM screen: grossly WNL for B hip and knee major joints  painfree  Strength (MMT) 5/5 ANTONIO LE  grossly assessed except hip extensors at 4+/5      Transverse Abdominis: 3/5    Diastasis Recti: (finger width depth while contracted)  Above Umbilicus: 0  Umbilicus: 2  Below Umbilicus: 0      Goals:   goals addressed this day as noted above  Goals: (to be met in 8-10 visits)     Pt will be I with HEP,its progression and management of symptoms, biomechanical strategies and self correction of upright posturing and PFM facilitation to manage IAP with daily tasks  Patient will verbalize improved  understanding of  bladder/bowel habits ,bladder ,bladder retraining  and long term management  Patient will exhibit an increase ability  in isolated pelvic floor strength  with improved  PERF , with appropriate relaxation from  for improve pelvic brace /IAP management with ADLS  manage UI/ASH and prevent leakage during ADLS  Patient to report urinary frequency to every 2-4 hours to allow for independent ADLs  Patient reports a reduction in ASH 50% or better to decrease pad use from  to 0/ day to maintain skin integrity of vulva area.  Patient to utilize proper toileting posture to allow for decreased straining with voiding and BM  Pt will report decreased nocturia incidents  to 0 x a night to promote better sleeping pattern  Pt will demonstrate biomechanical strategies for managing IAP for safe performance of  daily, recreational  and work tasks, 75% of the time to avoid incontinence issues, decreased risk of laxity progression  Pt will demonstrate decreased muscular tone/tenderness on PFM  to minimal to  none in order to safely progress with PFM strengthening and stabilization as  appropriate     Plan:cont per POC, will assess previous treatment and continue with strengthening with TRA combination     Date: 4/5/24  TX#: 2/8-10 Date:             4/12/24  TX#: 3/ Date:            4/19/24  TX#: 4/ Date:             4/26/24  TX#: 5/   Date:   Tx#: 6/  PFDI   Theraex: TrA with PF  then hip adduction isometric with ball  5 secs x10    TrA /PFM with bridges x10          SLR  x10    SL hip abd x10    TrA /PFM with bridges x10     Prone hip extension x10 SLR  x10 x2    SL hip abd x10 x2    TrA /PFM with bridges x10     Prone hip extension x10x2 Standing hip exercises RTB 2x10 on foam    Shuttle BLE 6 bands 2x10    Shuttle  R/L LE 4 bands 2x10    Manual:  Informed consent for internal pelvic evaluation given: Yes    PFM :   MFR/internal vaginal work  Layers 1-3 focused on          NMRed:  see below        INTERNAL RELAXATION :intervaginal       Diaphragmatic breathing x5 reps    Diaphragmatic breathing with PFM coordination x5     facilitation  PFM long hold x 3 secs x5  PFM quick contraction   PFM with precontractions with  cough x3 reps   TRA facilitation x3 reps       EXTERNAL Diaphragmatic breathing with PFM coordination x 2    PFM long hold x 3 secs x 2  reps : supine     TrA facilitation 5 secs x 3 reps  TrA with PF x 3  reps      Diaphragmatic breathing x 2 reps then on SB x2    Diaphragmatic breathing with PFM coordination x 2 in supine and on SB x2    PFM long hold x 10 secs x 2  reps : supine; SL then   seated on swiss ball     PFM quick contraction x 1- 2secs hold x10; supine / seated on SB       TrA facilitation 5 secs x 2 reps  TrA with PF x 2  reps    Supine and standing     PRECONTRACTIONS and PFM facilitation/posture awareness with cues for BIOMECHANICAL STRATEGIES     precontractions of PFM with cough x2; supine      sit to stand x 5 reps    DB x PFM  PFM long hold x 10 secs x 5  reps : supine    PRECONTRACTIONS and PFM facilitation/posture awareness with cues for BIOMECHANICAL  STRATEGIES     precontractions of PFM with cough x2; supine   ball throws to trampoline 4# 2x10    Step ups 6\" 2x10    TRA DB x PFM  PFM long hold x 10 secs x 3 reps : standing  PF quick contractions x10 reps in standing    ball throws to trampoline 4# 2x10  BLE     Swiss ball squats x10  SLS on R?L LE 5 secx x5    Education: PT provided/reviewed education on coordination of diaphragmatic breathing and pelvic floor contraction  and knack/pelvic muscle brace with hand outs given        HEP See pt instructions tab for new HEP today  See tab and PFM contractions See pt instructions tab for new HEP today    See tab See pt instructions tab for new HEP today See pt instructions tab for new HEP today See pt instructions tab for new HEP today        Charges: thereax x2 (30mins), neuro lorenzo x1 (15 mins)       Total Timed Treatment: 45 min  Total Treatment Time: 45 min

## 2024-04-26 ENCOUNTER — OFFICE VISIT (OUTPATIENT)
Dept: PHYSICAL THERAPY | Age: 44
End: 2024-04-26
Attending: OBSTETRICS & GYNECOLOGY
Payer: COMMERCIAL

## 2024-04-26 PROCEDURE — 97112 NEUROMUSCULAR REEDUCATION: CPT

## 2024-04-26 PROCEDURE — 97110 THERAPEUTIC EXERCISES: CPT

## 2024-05-02 NOTE — PROGRESS NOTES
Dx: ASH (stress urinary incontinence, female) (N39.3             Insurance   PPO           Authorized  # visits by insurance  :  8-10    Expiration date  of Authorization:6/20/24   Eval date/latest PN:3/22/24  Initial POC# of visits: 8-10           POC cert date : 6/20/24  Authorizing Physician: Dr. Quesada    Fall Risk: standard         Precautions: none         Subjective:pt states that she has changed to birth control pills and has her real cycle. She has a yeast infection and has not been able to do the PFM  contractions as its not comfortable.  Pt states that her incontinence is better but she has a hard time putting and keeping tampon on.    PAIN LEVEL:0/10  Assessment:   PT continue to work on loaded and dynamic exercises with cues needed for proper hip hinge with squats, better with repetitions. Worked with dynamic hip strengthening as noted below and gentle ply for IAP management      Objective:      Leaking improved by: 25 %  Stress incontinence:  Yes   Amount of leakage: coughs  Pad use: panty shields   Pad Change frequency: 1-2 when out of the house    Trouble emptying bladder: completely :No   Post void dribble: No   Nocturia: No usually 4x a week  Triggers :  No   Fluid Intake: water intake is bowel    BOWEL HABITS  Types of symptoms:  normal  SEXUAL HEALTH STATUS      no issues        lnternal Palpation Left Right Comments   Superficial Transverse perineal WFL WFL     Bulbocavernosus WFL WFL     Ischiocavernosus WFL WFL     Deep Transverse Perineal WFL WFL     Compress Urethrae/Spinc. Urethrovaginalis    WNL WNL     Pubococcygeus/Pubovaginalis WNL WNL     Iliococcygeus WNL WNL     Obturator internus WNL WNL     Piriformis NT internally NT internally     Coccygeus Not tested Not tested          PELVIC HEALTH  Posture : sway back  Range Of Motion  Lumbar AROM screen: WFL,painful at end range    Palpation:     Adductors :WNL   Greater trochanter/Gluteal area :minimal restriction    Piriformis :minimal  restriction   Paravertebrals: minimal restriction and moderate restriction     LE AROM screen: grossly WNL for B hip and knee major joints  painfree  Strength (MMT) 5/5 ANTONIO LE  grossly assessed except hip extensors at 4+/5      Transverse Abdominis: 3/5    Diastasis Recti: (finger width depth while contracted)  Above Umbilicus: 0  Umbilicus: 2  Below Umbilicus: 0      Goals:   goals addressed this day as noted above  Goals: (to be met in 8-10 visits)     Pt will be I with HEP,its progression and management of symptoms, biomechanical strategies and self correction of upright posturing and PFM facilitation to manage IAP with daily tasks  Patient will verbalize improved  understanding of  bladder/bowel habits ,bladder ,bladder retraining  and long term management  Patient will exhibit an increase ability  in isolated pelvic floor strength  with improved  PERF , with appropriate relaxation from  for improve pelvic brace /IAP management with ADLS  manage UI/ASH and prevent leakage during ADLS  Patient to report urinary frequency to every 2-4 hours to allow for independent ADLs  Patient reports a reduction in ASH 50% or better to decrease pad use from  to 0/ day to maintain skin integrity of vulva area.  Patient to utilize proper toileting posture to allow for decreased straining with voiding and BM  Pt will report decreased nocturia incidents  to 0 x a night to promote better sleeping pattern  Pt will demonstrate biomechanical strategies for managing IAP for safe performance of  daily, recreational  and work tasks, 75% of the time to avoid incontinence issues, decreased risk of laxity progression  Pt will demonstrate decreased muscular tone/tenderness on PFM  to minimal to  none in order to safely progress with PFM strengthening and stabilization as appropriate     Plan:cont per POC, will assess previous treatment and continue with strengthening with TRA combination     Date: 4/5/24  TX#: 2/8-10 Date:              4/12/24  TX#: 3/ Date:            4/19/24  TX#: 4/ Date:             4/26/24  TX#: 5/   Date: 5/3/24  Tx#: 6/  PFDI   Theraex: TrA with PF  then hip adduction isometric with ball  5 secs x10    TrA /PFM with bridges x10          SLR  x10    SL hip abd x10    TrA /PFM with bridges x10     Prone hip extension x10 SLR  x10 x2    SL hip abd x10 x2    TrA /PFM with bridges x10     Prone hip extension x10x2 Standing hip exercises RTB 2x10 on foam    Shuttle BLE 6 bands 2x10    Shuttle  R/L LE 4 bands 2x10 Lateral walks RTB x2 rounds  Monster/retro walks RTB x 2 rounds    Squats with hip abd RTB  Shuttle BLE 6 bands 2x10 R/L LE     Manual:  Informed consent for internal pelvic evaluation given: Yes    PFM :   MFR/internal vaginal work  Layers 1-3 focused on          NMRed:  see below        INTERNAL RELAXATION :intervaginal       Diaphragmatic breathing x5 reps    Diaphragmatic breathing with PFM coordination x5     facilitation  PFM long hold x 3 secs x5  PFM quick contraction   PFM with precontractions with  cough x3 reps   TRA facilitation x3 reps       EXTERNAL Diaphragmatic breathing with PFM coordination x 2    PFM long hold x 3 secs x 2  reps : supine     TrA facilitation 5 secs x 3 reps  TrA with PF x 3  reps      Diaphragmatic breathing x 2 reps then on SB x2    Diaphragmatic breathing with PFM coordination x 2 in supine and on SB x2    PFM long hold x 10 secs x 2  reps : supine; SL then   seated on swiss ball     PFM quick contraction x 1- 2secs hold x10; supine / seated on SB       TrA facilitation 5 secs x 2 reps  TrA with PF x 2  reps    Supine and standing     PRECONTRACTIONS and PFM facilitation/posture awareness with cues for BIOMECHANICAL STRATEGIES     precontractions of PFM with cough x2; supine      sit to stand x 5 reps    DB x PFM  PFM long hold x 10 secs x 5  reps : supine    PRECONTRACTIONS and PFM facilitation/posture awareness with cues for BIOMECHANICAL STRATEGIES     precontractions of PFM with  cough x2; supine   ball throws to trampoline 4# 2x10    Step ups 6\" 2x10    TRA DB x PFM  PFM long hold x 10 secs x 3 reps : standing  PF quick contractions x10 reps in standing    ball throws to trampoline 4# 2x10  BLE     Swiss ball squats x10  SLS on R?L LE 5 secx x5 Diaphragmatic breathing x 2 reps    Diaphragmatic breathing with PFM coordination x 2    PFM long hold x 10 secs x 2  reps : supine     PFM quick contraction x 1- 2secs hold x10; supine      ascending motions x3 : supine /standing       PRECONTRACTIONS and PFM facilitation/posture awareness with cues for BIOMECHANICAL STRATEGIES    Low pull 5# 2x10    Shuttle gentle plyo jump 2x10 x3 bands  then single leg     Hip hinges 2x10   Education: PT provided/reviewed education on coordination of diaphragmatic breathing and pelvic floor contraction  and knack/pelvic muscle brace with hand outs given        HEP See pt instructions tab for new HEP today  See tab and PFM contractions See pt instructions tab for new HEP today    See tab See pt instructions tab for new HEP today See pt instructions tab for new HEP today See pt instructions tab for new HEP today        Charges: thereax x1 (15mins), neuro lorenzo x2 (25 mins)       Total Timed Treatment: 40 min  Total Treatment Time: 40 min

## 2024-05-03 ENCOUNTER — OFFICE VISIT (OUTPATIENT)
Dept: PHYSICAL THERAPY | Age: 44
End: 2024-05-03
Attending: OBSTETRICS & GYNECOLOGY
Payer: COMMERCIAL

## 2024-05-03 PROCEDURE — 97112 NEUROMUSCULAR REEDUCATION: CPT

## 2024-05-03 PROCEDURE — 97110 THERAPEUTIC EXERCISES: CPT

## 2024-05-10 ENCOUNTER — APPOINTMENT (OUTPATIENT)
Dept: PHYSICAL THERAPY | Age: 44
End: 2024-05-10
Attending: OBSTETRICS & GYNECOLOGY
Payer: COMMERCIAL

## 2024-05-15 NOTE — PROGRESS NOTES
Dx: ASH (stress urinary incontinence, female) (N39.3             Insurance   PPO           Authorized  # visits by insurance  :  8-10    Expiration date  of Authorization:6/20/24   Eval date/latest PN:3/22/24  Initial POC# of visits: 8-10           POC cert date : 6/20/24  Authorizing Physician: Dr. Quesada    Fall Risk: standard         Precautions: none         Subjective: pt states that she feels comfortable not leaking, ASH with  sneezing and coughing is better and she is able to manage pressure. Pt states that she has been able to 19, 000 and developed some tightness on the calf.  PAIN LEVEL:0/10  Assessment:   PT continue to work on loaded and dynamic exercises with cues needed for proper posture.PT added agility exercises and pt did well with no issues of ASH  Objective:      Leaking improved by: 25 %  Stress incontinence:  Yes   Amount of leakage: coughs  Pad use: panty shields   Pad Change frequency: 1-2 when out of the house    Trouble emptying bladder: completely :No   Post void dribble: No   Nocturia: No usually 4x a week  Triggers :  No   Fluid Intake: water intake is better    BOWEL HABITS  Types of symptoms:  normal  SEXUAL HEALTH STATUS      no issues        lnternal Palpation Left Right Comments   Superficial Transverse perineal WFL WFL     Bulbocavernosus WFL WFL     Ischiocavernosus WFL WFL     Deep Transverse Perineal WFL WFL     Compress Urethrae/Spinc. Urethrovaginalis    WNL WNL     Pubococcygeus/Pubovaginalis WNL WNL     Iliococcygeus WNL WNL     Obturator internus WNL WNL     Piriformis NT internally NT internally     Coccygeus Not tested Not tested          PELVIC HEALTH  Posture : sway back  Range Of Motion  Lumbar AROM screen: WFL,painful at end range    Palpation:     Adductors :WNL   Greater trochanter/Gluteal area :minimal restriction    Piriformis :minimal restriction   Paravertebrals: minimal restriction and moderate restriction     LE AROM screen: grossly WNL for B hip and knee major  joints  painfree  Strength (MMT) 5/5 ANTONIO LE  grossly assessed except hip extensors at 4+/5      Transverse Abdominis: 3/5    Diastasis Recti: (finger width depth while contracted)  Above Umbilicus: 0  Umbilicus: 2  Below Umbilicus: 0      Goals:   goals addressed this day as noted above  Goals: (to be met in 8-10 visits)     Pt will be I with HEP,its progression and management of symptoms, biomechanical strategies and self correction of upright posturing and PFM facilitation to manage IAP with daily tasks  Patient will verbalize improved  understanding of  bladder/bowel habits ,bladder ,bladder retraining  and long term management  Patient will exhibit an increase ability  in isolated pelvic floor strength  with improved  PERF , with appropriate relaxation from  for improve pelvic brace /IAP management with ADLS  manage UI/ASH and prevent leakage during ADLS  Patient to report urinary frequency to every 2-4 hours to allow for independent ADLs  Patient reports a reduction in ASH 50% or better to decrease pad use from  to 0/ day to maintain skin integrity of vulva area.  Patient to utilize proper toileting posture to allow for decreased straining with voiding and BM  Pt will report decreased nocturia incidents  to 0 x a night to promote better sleeping pattern  Pt will demonstrate biomechanical strategies for managing IAP for safe performance of  daily, recreational  and work tasks, 75% of the time to avoid incontinence issues, decreased risk of laxity progression  Pt will demonstrate decreased muscular tone/tenderness on PFM  to minimal to  none in order to safely progress with PFM strengthening and stabilization as appropriate     Plan:cont per POC, will assess previous treatment and continue with strengthening with TRA combination     Date: 4/5/24  TX#: 2/8-10 Date:             4/12/24  TX#: 3/ Date:            4/19/24  TX#: 4/ Date:             4/26/24  TX#: 5/   Date: 5/3/24  Tx#: 6/  PFDI Date: 5/17/24  Tx:7    Theraex: TrA with PF  then hip adduction isometric with ball  5 secs x10    TrA /PFM with bridges x10          SLR  x10    SL hip abd x10    TrA /PFM with bridges x10     Prone hip extension x10 SLR  x10 x2    SL hip abd x10 x2    TrA /PFM with bridges x10     Prone hip extension x10x2 Standing hip exercises RTB 2x10 on foam    Shuttle BLE 6 bands 2x10    Shuttle  R/L LE 4 bands 2x10 Lateral walks RTB x2 rounds  Monster/retro walks RTB x 2 rounds    Squats with hip abd RTB  Shuttle BLE 6 bands 2x10 R/L LE   Inclined stretches: gastrocs and soleus 30 x 3    Lateral walks RTB x2 rounds  Monster/retro walks RTB x 2 rounds    Shuttle R/L LE 5 bands 2x10   Manual:  Informed consent for internal pelvic evaluation given: Yes    PFM :   MFR/internal vaginal work  Layers 1-3 focused on           NMRed:  see below         INTERNAL RELAXATION :intervaginal       Diaphragmatic breathing x5 reps    Diaphragmatic breathing with PFM coordination x5     facilitation  PFM long hold x 3 secs x5  PFM quick contraction   PFM with precontractions with  cough x3 reps   TRA facilitation x3 reps        EXTERNAL Diaphragmatic breathing with PFM coordination x 2    PFM long hold x 3 secs x 2  reps : supine     TrA facilitation 5 secs x 3 reps  TrA with PF x 3  reps      Diaphragmatic breathing x 2 reps then on SB x2    Diaphragmatic breathing with PFM coordination x 2 in supine and on SB x2    PFM long hold x 10 secs x 2  reps : supine; SL then   seated on swiss ball     PFM quick contraction x 1- 2secs hold x10; supine / seated on SB       TrA facilitation 5 secs x 2 reps  TrA with PF x 2  reps    Supine and standing     PRECONTRACTIONS and PFM facilitation/posture awareness with cues for BIOMECHANICAL STRATEGIES     precontractions of PFM with cough x2; supine      sit to stand x 5 reps    DB x PFM  PFM long hold x 10 secs x 5  reps : supine    PRECONTRACTIONS and PFM facilitation/posture awareness with cues for BIOMECHANICAL  STRATEGIES     precontractions of PFM with cough x2; supine   ball throws to trampoline 4# 2x10    Step ups 6\" 2x10    TRA DB x PFM  PFM long hold x 10 secs x 3 reps : standing  PF quick contractions x10 reps in standing    ball throws to trampoline 4# 2x10  BLE     Swiss ball squats x10  SLS on R?L LE 5 secx x5 Diaphragmatic breathing x 2 reps    Diaphragmatic breathing with PFM coordination x 2    PFM long hold x 10 secs x 2  reps : supine     PFM quick contraction x 1- 2secs hold x10; supine      ascending motions x3 : supine /standing       PRECONTRACTIONS and PFM facilitation/posture awareness with cues for BIOMECHANICAL STRATEGIES    Low pull 5# 2x10    Shuttle gentle plyo jump 2x10 x3 bands  then single leg     Hip hinges 2x10 Swiss ball squats 2x10 with hip abd with RTB   Then sustained squats with 4# ball 30 secs x 2    PRECONTRACTIONS and PFM facilitation/posture awareness with cues for BIOMECHANICAL STRATEGIES      Shuttle gentle plyo jump 2x10 x3 bands then high knees x2 bands 30 secs x2    Bosu lunges forward and sideways    Straddle steps 4\" 30  secs x2       Education: PT provided/reviewed education on coordination of diaphragmatic breathing and pelvic floor contraction  and knack/pelvic muscle brace with hand outs given         HEP See pt instructions tab for new HEP today  See tab and PFM contractions See pt instructions tab for new HEP today    See tab See pt instructions tab for new HEP today See pt instructions tab for new HEP today See pt instructions tab for new HEP today         Charges: thereax x1 (15mins), neuro lorenzo x2 (30 mins)       Total Timed Treatment: 45 min  Total Treatment Time: 45 min

## 2024-05-17 ENCOUNTER — OFFICE VISIT (OUTPATIENT)
Dept: PHYSICAL THERAPY | Age: 44
End: 2024-05-17
Attending: OBSTETRICS & GYNECOLOGY
Payer: COMMERCIAL

## 2024-05-17 PROCEDURE — 97110 THERAPEUTIC EXERCISES: CPT

## 2024-05-17 PROCEDURE — 97112 NEUROMUSCULAR REEDUCATION: CPT

## 2024-05-23 NOTE — PROGRESS NOTES
Dx: ASH (stress urinary incontinence, female) (N39.3             Insurance   PPO           Authorized  # visits by insurance  :  8-10    Expiration date  of Authorization:6/20/24   Eval date/latest PN:3/22/24  Initial POC# of visits: 8-10           POC cert date : 6/20/24  Authorizing Physician: Dr. Quesada    Fall Risk: standard         Precautions: none         Subjective: pt states that since last session her shoulder has been hurting L shoulder pain 2/10 as she injured this last April. She has been doing well with ASH , able to do without a pad when she went outside the house.Pt states that she has not been able to do consistent exercises aside from her PFM contraction  PAIN LEVEL:0/10.  Assessment:     Pt had pt focu on proper posturing including scapula stabilizers, neded cues to engage core and scapula setting, additional HEP done and was told to stop ex and call MD if worse  Added agiity exercises with no issues , reported more challenge with lateral steps  Objective: ( 5/24/24 data)      Leaking improved by: 25 %  Stress incontinence:  Yes   Amount of leakage: coughs  Pad use: panty shields   Pad Change frequency: 1-2 when out of the house    Trouble emptying bladder: completely :No   Post void dribble: No   Nocturia: No usually 4x a week  Triggers :  No   Fluid Intake: water intake is better    BOWEL HABITS  Types of symptoms:  normal  SEXUAL HEALTH STATUS      no issues     PELVIC HEALTH  Posture : sway back    LE AROM screen: grossly WNL for B hip and knee major joints  painfree  Strength (MMT) 5/5 ANTONIO LE  grossly assessed except hip extensors at 4+/5      Transverse Abdominis: 3/5    Diastasis Recti: (finger width depth while contracted)  Above Umbilicus: 0  Umbilicus: 2  Below Umbilicus: 0      Goals:   goals addressed this day as noted above  Goals: (to be met in 8-10 visits)     Pt will be I with HEP,its progression and management of symptoms, biomechanical strategies and self correction of upright  posturing and PFM facilitation to manage IAP with daily tasks  Patient will verbalize improved  understanding of  bladder/bowel habits ,bladder ,bladder retraining  and long term management  Patient will exhibit an increase ability  in isolated pelvic floor strength  with improved  PERF , with appropriate relaxation from  for improve pelvic brace /IAP management with ADLS  manage UI/ASH and prevent leakage during ADLS  Patient to report urinary frequency to every 2-4 hours to allow for independent ADLs  Patient reports a reduction in ASH 50% or better to decrease pad use from  to 0/ day to maintain skin integrity of vulva area.  Patient to utilize proper toileting posture to allow for decreased straining with voiding and BM  Pt will report decreased nocturia incidents  to 0 x a night to promote better sleeping pattern  Pt will demonstrate biomechanical strategies for managing IAP for safe performance of  daily, recreational  and work tasks, 75% of the time to avoid incontinence issues, decreased risk of laxity progression  Pt will demonstrate decreased muscular tone/tenderness on PFM  to minimal to  none in order to safely progress with PFM strengthening and stabilization as appropriate     Plan:cont per POC, will assess previous treatment and continue with strengthening with TRA combination     Date: 4/5/24  TX#: 2/8-10 Date:             4/12/24  TX#: 3/ Date:            4/19/24  TX#: 4/ Date:             4/26/24  TX#: 5/   Date: 5/3/24  Tx#: 6/  PFDI Date: 5/17/24  Tx:7 Date: 5/24/24  Tx: 8   Theraex: TrA with PF  then hip adduction isometric with ball  5 secs x10    TrA /PFM with bridges x10          SLR  x10    SL hip abd x10    TrA /PFM with bridges x10     Prone hip extension x10 SLR  x10 x2    SL hip abd x10 x2    TrA /PFM with bridges x10     Prone hip extension x10x2 Standing hip exercises RTB 2x10 on foam    Shuttle BLE 6 bands 2x10    Shuttle  R/L LE 4 bands 2x10 Lateral walks RTB x2 rounds  Monster/retro  walks RTB x 2 rounds    Squats with hip abd RTB  Shuttle BLE 6 bands 2x10 R/L LE   Inclined stretches: gastrocs and soleus 30 x 3    Lateral walks RTB x2 rounds  Monster/retro walks RTB x 2 rounds    Shuttle R/L LE 5 bands 2x10 Shuttle BLE 8 band 2x10    Shuttle R/L LE 6 bands 2x10     Manual:  Informed consent for internal pelvic evaluation given: Yes    PFM :   MFR/internal vaginal work  Layers 1-3 focused on            NMRed:  see below          INTERNAL RELAXATION :intervaginal       Diaphragmatic breathing x5 reps    Diaphragmatic breathing with PFM coordination x5     facilitation  PFM long hold x 3 secs x5  PFM quick contraction   PFM with precontractions with  cough x3 reps   TRA facilitation x3 reps         EXTERNAL Diaphragmatic breathing with PFM coordination x 2    PFM long hold x 3 secs x 2  reps : supine     TrA facilitation 5 secs x 3 reps  TrA with PF x 3  reps      Diaphragmatic breathing x 2 reps then on SB x2    Diaphragmatic breathing with PFM coordination x 2 in supine and on SB x2    PFM long hold x 10 secs x 2  reps : supine; SL then   seated on swiss ball     PFM quick contraction x 1- 2secs hold x10; supine / seated on SB       TrA facilitation 5 secs x 2 reps  TrA with PF x 2  reps    Supine and standing     PRECONTRACTIONS and PFM facilitation/posture awareness with cues for BIOMECHANICAL STRATEGIES     precontractions of PFM with cough x2; supine      sit to stand x 5 reps    DB x PFM  PFM long hold x 10 secs x 5  reps : supine    PRECONTRACTIONS and PFM facilitation/posture awareness with cues for BIOMECHANICAL STRATEGIES     precontractions of PFM with cough x2; supine   ball throws to trampoline 4# 2x10    Step ups 6\" 2x10    TRA DB x PFM  PFM long hold x 10 secs x 3 reps : standing  PF quick contractions x10 reps in standing    ball throws to trampoline 4# 2x10  BLE     Swiss ball squats x10  SLS on R?L LE 5 secx x5 Diaphragmatic breathing x 2 reps    Diaphragmatic breathing with PFM  coordination x 2    PFM long hold x 10 secs x 2  reps : supine     PFM quick contraction x 1- 2secs hold x10; supine      ascending motions x3 : supine /standing       PRECONTRACTIONS and PFM facilitation/posture awareness with cues for BIOMECHANICAL STRATEGIES    Low pull 5# 2x10    Shuttle gentle plyo jump 2x10 x3 bands  then single leg     Hip hinges 2x10 Swiss ball squats 2x10 with hip abd with RTB   Then sustained squats with 4# ball 30 secs x 2    PRECONTRACTIONS and PFM facilitation/posture awareness with cues for BIOMECHANICAL STRATEGIES      Shuttle gentle plyo jump 2x10 x3 bands then high knees x2 bands 30 secs x2    Bosu lunges forward and sideways    Straddle steps 4\" 30  secs x2     Postural corrections:    Scapula squuezes x10  Pizza carry x10     scapula retractions 2x10 YTB narrow and extension    Pinky on the wall x10    High knees 30 secs x1    Skaters x 30 secs   Squat jumps x5    Bosu lunges x2x10 sideways         Education: PT provided/reviewed education on coordination of diaphragmatic breathing and pelvic floor contraction  and knack/pelvic muscle brace with hand outs given          HEP See pt instructions tab for new HEP today  See tab and PFM contractions See pt instructions tab for new HEP today    See tab See pt instructions tab for new HEP today See pt instructions tab for new HEP today See pt instructions tab for new HEP today          Charges: thereax x1 (10mins), neuro lorenzo x2 (35 mins)       Total Timed Treatment: 45 min  Total Treatment Time: 45 min

## 2024-05-24 ENCOUNTER — OFFICE VISIT (OUTPATIENT)
Dept: PHYSICAL THERAPY | Age: 44
End: 2024-05-24
Attending: OBSTETRICS & GYNECOLOGY
Payer: COMMERCIAL

## 2024-05-24 PROCEDURE — 97112 NEUROMUSCULAR REEDUCATION: CPT

## 2024-05-24 PROCEDURE — 97110 THERAPEUTIC EXERCISES: CPT

## 2024-05-30 NOTE — PROGRESS NOTES
Dx: ASH (stress urinary incontinence, female) (N39.3             Insurance   PPO           Authorized  # visits by insurance  :  8-10    Expiration date  of Authorization:6/20/24   Eval date/latest PN:3/22/24  Initial POC# of visits: 8-10           POC cert date : 6/20/24  Authorizing Physician: Dr. Quesada    Fall Risk: standard         Precautions: none         Subjective: Pt states that she did scapula exercises and seems to help with the pain  a bit.Pt states that when she  still has difficulty keeping tampon in place. She is 75% back to normal  PAIN LEVEL:0/10.  Assessment: improved functional outcome measures     Pt has been doing well with HEP performance,its progression and symptoms management so will be a good candidate to discharge to [facility]  after few sessions. She is able to perform exercises with minimal to no cues provided by clinician. progressing towards goals           See exercises as logged below   Objective:   PFDI-20: PFDI 20    Scores   POPDI 6:    37.5   CRAD 8:    31.25   PARVEEN 6:    58.33   Summary:    127.08 from eval now at 87.5    Do you usually experience pressure in the lower abdomen? Not at all   Do you usually experience heaviness or dullness in the pelvic area? Not at all   Do you usually have a bulge or something falling out that you can see or feel in your vaginal area? Not at all   Do you ever have to push on the vagina or around the rectum to have or complete a bowel movement? Not at all   Do you usually experience a feeling of incomplete bladder emptying? Somewhat   Do you ever have to push up on a bulge in the vaginal area with your fingers to start or complete urination? Not at all   Do you feel you need to strain too hard to have a bowel movement? Not at all   Do you feel you have not completely emptied your bowels at the end of a bowel movement? Not at all   Do you usually lose stool beyond your control if your stool is well formed? Not at all   Do you usually lose stool beyond  your control if your stool is loose? Not at all   Do you usually lose gas from the rectum beyond your control? Not at all   Do you usually have pain when you pass your stool? Not at all   Do you experience strong sense of urgency and have to rush to bathroom for bowel movement? Not at all   Does part of bowel ever pass through rectum and bulge outside during/after bowel movement? Not at all   Do you usually experience frequent urination? Not at all   Do you usually experience urine leakage associated with a feeling of urgency, that is, a strong sensation of needing to go to the bathroom? Not at all   Do you usually experience urine leakage related to coughing, sneezing, or laughing? Somewhat   Do you usually experience small amounts of urine leakage (that is, drops)? Not at all   Do you usually experience difficulty empyting your bladder? Somewhat   Do you usually experience pain or discomfort in the lower abdomen or genital region? Not at all   Pelvic Organ Prolapse Distress Inventory 6 Score (range: 0 - 100) 29.17   Colorectal-Anal Distress Inventory 8 Score (range: 0 - 100) 25   Urinary Distress Inventory 6 Score (range: 0 - 100) 33.33   PFDI Summary Score (range: 0 - 300) 87.5     ( 5/24/24 data)      Leaking improved by: 50 %  Stress incontinence:  Yes   Amount of leakage: coughs  Pad use: panty shields   Pad Change frequency: 1-2 when out of the house    Trouble emptying bladder: completely :No   Post void dribble: No   Nocturia: No usually 1x a week  Triggers :  No   Fluid Intake: water intake is better    BOWEL HABITS  Types of symptoms:  normal  SEXUAL HEALTH STATUS      no issues     PELVIC HEALTH     PELVIC HEALTH  Posture : sway back  Range Of Motion  Lumbar AROM screen: WFL,painful at end range  of extension      Palpation:      Adductors :WNL   Greater trochanter/Gluteal area :minimal restriction    Piriformis :minimal restriction   Paravertebrals: minimal restriction       LE AROM screen: grossly WNL  for B hip and knee major joints  painfree  Strength (MMT) 5/5 ANTONIO LE  grossly assessed except hip extensors at 4+/5            Transverse Abdominis: 3/5    Diastasis Recti: (finger width depth while contracted)  Above Umbilicus: 0  Umbilicus: 2  Below Umbilicus: 0      Goals:   goals addressed this day as noted above  Goals: (to be met in 8-10 visits)     Pt will be I with HEP,its progression and management of symptoms, biomechanical strategies and self correction of upright posturing and PFM facilitation to manage IAP with daily tasks  Patient will verbalize improved  understanding of  bladder/bowel habits ,bladder ,bladder retraining  and long term management  Patient will exhibit an increase ability  in isolated pelvic floor strength  with improved  PERF , with appropriate relaxation from  for improve pelvic brace /IAP management with ADLS  manage UI/ASH and prevent leakage during ADLS  Patient to report urinary frequency to every 2-4 hours to allow for independent ADLs  Patient reports a reduction in ASH 50% or better to decrease pad use from  to 0/ day to maintain skin integrity of vulva area.  Patient to utilize proper toileting posture to allow for decreased straining with voiding and BM  Pt will report decreased nocturia incidents  to 0 x a night to promote better sleeping pattern  Pt will demonstrate biomechanical strategies for managing IAP for safe performance of  daily, recreational  and work tasks, 75% of the time to avoid incontinence issues, decreased risk of laxity progression  Pt will demonstrate decreased muscular tone/tenderness on PFM  to minimal to  none in order to safely progress with PFM strengthening and stabilization as appropriate     Plan:anticipate discharge after 1 session   Date: 4/5/24  TX#: 2/8-10 Date:             4/12/24  TX#: 3/ Date:            4/19/24  TX#: 4/ Date:             4/26/24  TX#: 5/   Date: 5/3/24  Tx#: 6/  PFDI Date: 5/17/24  Tx:7 Date: 5/24/24  Tx: 8 Date:  5/31/24  Tx: 9   Theraex: TrA with PF  then hip adduction isometric with ball  5 secs x10    TrA /PFM with bridges x10          SLR  x10    SL hip abd x10    TrA /PFM with bridges x10     Prone hip extension x10 SLR  x10 x2    SL hip abd x10 x2    TrA /PFM with bridges x10     Prone hip extension x10x2 Standing hip exercises RTB 2x10 on foam    Shuttle BLE 6 bands 2x10    Shuttle  R/L LE 4 bands 2x10 Lateral walks RTB x2 rounds  Monster/retro walks RTB x 2 rounds    Squats with hip abd RTB  Shuttle BLE 6 bands 2x10 R/L LE   Inclined stretches: gastrocs and soleus 30 x 3    Lateral walks RTB x2 rounds  Monster/retro walks RTB x 2 rounds    Shuttle R/L LE 5 bands 2x10 Shuttle BLE 8 band 2x10    Shuttle R/L LE 6 bands 2x10   Supine bridges  Single leg 2x10  Swiss ball squats 2x10   Manual:  Informed consent for internal pelvic evaluation given: Yes    PFM :   MFR/internal vaginal work  Layers 1-3 focused on             NMRed:  see below           INTERNAL RELAXATION :intervaginal       Diaphragmatic breathing x5 reps    Diaphragmatic breathing with PFM coordination x5     facilitation  PFM long hold x 3 secs x5  PFM quick contraction   PFM with precontractions with  cough x3 reps   TRA facilitation x3 reps          EXTERNAL Diaphragmatic breathing with PFM coordination x 2    PFM long hold x 3 secs x 2  reps : supine     TrA facilitation 5 secs x 3 reps  TrA with PF x 3  reps      Diaphragmatic breathing x 2 reps then on SB x2    Diaphragmatic breathing with PFM coordination x 2 in supine and on SB x2    PFM long hold x 10 secs x 2  reps : supine; SL then   seated on swiss ball     PFM quick contraction x 1- 2secs hold x10; supine / seated on SB       TrA facilitation 5 secs x 2 reps  TrA with PF x 2  reps    Supine and standing     PRECONTRACTIONS and PFM facilitation/posture awareness with cues for BIOMECHANICAL STRATEGIES     precontractions of PFM with cough x2; supine      sit to stand x 5 reps    DB x PFM  PFM  long hold x 10 secs x 5  reps : supine    PRECONTRACTIONS and PFM facilitation/posture awareness with cues for BIOMECHANICAL STRATEGIES     precontractions of PFM with cough x2; supine   ball throws to trampoline 4# 2x10    Step ups 6\" 2x10    TRA DB x PFM  PFM long hold x 10 secs x 3 reps : standing  PF quick contractions x10 reps in standing    ball throws to trampoline 4# 2x10  BLE     Swiss ball squats x10  SLS on R?L LE 5 secx x5 Diaphragmatic breathing x 2 reps    Diaphragmatic breathing with PFM coordination x 2    PFM long hold x 10 secs x 2  reps : supine     PFM quick contraction x 1- 2secs hold x10; supine      ascending motions x3 : supine /standing       PRECONTRACTIONS and PFM facilitation/posture awareness with cues for BIOMECHANICAL STRATEGIES    Low pull 5# 2x10    Shuttle gentle plyo jump 2x10 x3 bands  then single leg     Hip hinges 2x10 Swiss ball squats 2x10 with hip abd with RTB   Then sustained squats with 4# ball 30 secs x 2    PRECONTRACTIONS and PFM facilitation/posture awareness with cues for BIOMECHANICAL STRATEGIES      Shuttle gentle plyo jump 2x10 x3 bands then high knees x2 bands 30 secs x2    Bosu lunges forward and sideways    Straddle steps 4\" 30  secs x2     Postural corrections:    Scapula squuezes x10  Pizza carry x10     scapula retractions 2x10 YTB narrow and extension    Pinky on the wall x10    High knees 30 secs x1    Skaters x 30 secs   Squat jumps x5    Bosu lunges x2x10 sideways       Quadruped   Modified planks  30 x2    Quadruped chest taps 2x10  Quadruped hip extension 2x10    B ER RTB  2x10    Scapula walks x10 diagonals and horizontal    Trampoline activities:     High knees 30 secs x2    Bosu lunges x2x10 sideways    SLS with isometric knee flexion with small ball on contralateral leg while movements done towards hip extension x10 then towards hip abd x10    Education: PT provided/reviewed education on coordination of diaphragmatic breathing and pelvic floor  contraction  and knack/pelvic muscle brace with hand outs given           HEP See pt instructions tab for new HEP today  See tab and PFM contractions See pt instructions tab for new HEP today    See tab See pt instructions tab for new HEP today See pt instructions tab for new HEP today See pt instructions tab for new HEP today           Charges: thereax x1 (10mins), neuro lorenzo x2 (35 mins)       Total Timed Treatment: 45 min  Total Treatment Time: 45 min

## 2024-05-31 ENCOUNTER — OFFICE VISIT (OUTPATIENT)
Dept: PHYSICAL THERAPY | Age: 44
End: 2024-05-31
Attending: OBSTETRICS & GYNECOLOGY
Payer: COMMERCIAL

## 2024-05-31 PROCEDURE — 97112 NEUROMUSCULAR REEDUCATION: CPT

## 2024-05-31 PROCEDURE — 97110 THERAPEUTIC EXERCISES: CPT

## 2024-06-06 NOTE — PROGRESS NOTES
Luis Miguel      Pt has attended 10 visits in Physical Therapy.     Dx: ASH (stress urinary incontinence, female) (N39.3             Insurance   PPO           Authorized  # visits by insurance  :  8-10    Expiration date  of Authorization:6/20/24   Eval date/latest PN:3/22/24  Initial POC# of visits: 8-10           POC cert date : 6/20/24  Authorizing Physician: Dr. Quesada    Fall Risk: standard         Precautions: none         Subjective: . She is 75% back to normal. She has been doing her HEP.    PAIN LEVEL:0/10.  Assessment:     Socorro attended 10 visits for Physical Therapy.      Socorro reports being back to prior level of function/better  by 75% .   Socorro still reports issues with stress incontinence and nocturia 1-2x a week  Socorro is agreeable  about discharge to HEP.    Pt  advised to follow up if symptoms persist,     Socorro demonstrates better understanding  of pelvic anatomy and function with diagrams and pelvic model, bladder normatives, adequate hydration levels, proper toileting posture, diaphragmatic breathing for PNS activation and pelvic floor relaxation , coordination of diaphragmatic breathing and pelvic floor contraction , and knack/pelvic muscle brace  Functional deficits include but are not subjective report above     Socorro has better  ability  to self correct upright posturing  for better IAP  pressure management and how to minimize symptoms and pain with proper body mechanics/strategies   .    Noted  improvement  on PFM function now  able to hold 10 secs with no issues including given progressive coordination/strengthening  Overall improvement UUI/SI :75 %  decreased pad usage to 1/day when she goes out and more of hygiene  Pt is able to void  better voiding intervals and is able to  utilize urge strategies and follow better bladder health.  Pt  reports increased water intake   Pt reports improved  nocturia incidents  to 1-2x /week    Objectives and goals as noted  below.    PFDI improved at : 127.08 from eval now at 87.5    Pt will be discharged from skilled PT at this time, being a good candidate to continue with HEP I. Pt voiced understanding and performs HEP   correctly without reported pain.Instructions were provided on how to modify as need or when to stop.     Objectives and goals as noted below.   Overall functional outcomes measures are noted to be improved.         See exercises as logged below   Objective:   PFDI-20: PFDI 20    Scores   POPDI 6:    37.5   CRAD 8:    31.25   PARVEEN 6:    58.33   Summary:    127.08 from eval now at 87.5    Do you usually experience pressure in the lower abdomen? Not at all   Do you usually experience heaviness or dullness in the pelvic area? Not at all   Do you usually have a bulge or something falling out that you can see or feel in your vaginal area? Not at all   Do you ever have to push on the vagina or around the rectum to have or complete a bowel movement? Not at all   Do you usually experience a feeling of incomplete bladder emptying? Somewhat   Do you ever have to push up on a bulge in the vaginal area with your fingers to start or complete urination? Not at all   Do you feel you need to strain too hard to have a bowel movement? Not at all   Do you feel you have not completely emptied your bowels at the end of a bowel movement? Not at all   Do you usually lose stool beyond your control if your stool is well formed? Not at all   Do you usually lose stool beyond your control if your stool is loose? Not at all   Do you usually lose gas from the rectum beyond your control? Not at all   Do you usually have pain when you pass your stool? Not at all   Do you experience strong sense of urgency and have to rush to bathroom for bowel movement? Not at all   Does part of bowel ever pass through rectum and bulge outside during/after bowel movement? Not at all   Do you usually experience frequent urination? Not at all   Do you usually  experience urine leakage associated with a feeling of urgency, that is, a strong sensation of needing to go to the bathroom? Not at all   Do you usually experience urine leakage related to coughing, sneezing, or laughing? Somewhat   Do you usually experience small amounts of urine leakage (that is, drops)? Not at all   Do you usually experience difficulty empyting your bladder? Somewhat   Do you usually experience pain or discomfort in the lower abdomen or genital region? Not at all   Pelvic Organ Prolapse Distress Inventory 6 Score (range: 0 - 100) 29.17   Colorectal-Anal Distress Inventory 8 Score (range: 0 - 100) 25   Urinary Distress Inventory 6 Score (range: 0 - 100) 33.33   PFDI Summary Score (range: 0 - 300) 87.5     ( 5/24/24 data)      Leaking improved by: 50 %  Stress incontinence:  Yes   Amount of leakage: coughs  Pad use: panty shields   Pad Change frequency: 1-2 when out of the house    Trouble emptying bladder: completely :No   Post void dribble: No   Nocturia: No usually 1x a week  Triggers :  No   Fluid Intake: water intake is better    BOWEL HABITS  Types of symptoms:  normal  SEXUAL HEALTH STATUS      no issues     PELVIC HEALTH     PELVIC HEALTH  Posture : sway back  Range Of Motion  Lumbar AROM screen: WFL,painful at end range  of extension      Palpation:      Adductors :WNL   Greater trochanter/Gluteal area :minimal restriction    Piriformis :minimal restriction   Paravertebrals: minimal restriction       LE AROM screen: grossly WNL for B hip and knee major joints  painfree  Strength (MMT) 5/5 ANTONIO LE  grossly assessed except hip extensors at 4+/5            Transverse Abdominis: 3/5    Diastasis Recti: (finger width depth while contracted)  Above Umbilicus: 0  Umbilicus: 2  Below Umbilicus: 0      Goals:   goals addressed this day as noted above  Goals: (to be met in 8-10 visits)     Pt will be I with HEP,its progression and management of symptoms, biomechanical strategies and self correction  of upright posturing and PFM facilitation to manage IAP with daily tasks  Patient will verbalize improved  understanding of  bladder/bowel habits ,bladder ,bladder retraining  and long term management  Patient will exhibit an increase ability  in isolated pelvic floor strength  with improved  PERF , with appropriate relaxation from  for improve pelvic brace /IAP management with ADLS  manage UI/ASH and prevent leakage during ADLS  Patient to report urinary frequency to every 2-4 hours to allow for independent ADLs  Patient reports a reduction in ASH 50% or better to decrease pad use from  to 0/ day to maintain skin integrity of vulva area.  Patient to utilize proper toileting posture to allow for decreased straining with voiding and BM  Pt will report decreased nocturia incidents  to 0 x a night to promote better sleeping pattern  Pt will demonstrate biomechanical strategies for managing IAP for safe performance of  daily, recreational  and work tasks, 75% of the time to avoid incontinence issues, decreased risk of laxity progression  Pt will demonstrate decreased muscular tone/tenderness on PFM  to minimal to  none in order to safely progress with PFM strengthening and stabilization as appropriate       Plan: D/C with continued compliance to HEP     Patient/Family/Caregiver was advised of these findings, precautions, and treatment options and has agreed to actively participate in planning and for this course of care.    Thank you for your referral. If you have any questions, please contact me at Dept: [phone].    Sincerely,  Electronically signed by therapist: Onelia Ornelas,PT,DPT,CAPP-OB  Physician's certification required:  YES    Date: 4/5/24  TX#: 2/8-10 Date:             4/12/24  TX#: 3/ Date:            4/19/24  TX#: 4/ Date:             4/26/24  TX#: 5/   Date: 5/3/24  Tx#: 6/  PFDI Date: 5/17/24  Tx:7 Date: 5/24/24  Tx: 8 Date: 5/31/24  Tx: 9 Date: 6/7/24  Tx:10   Theraex: TrA with PF  then hip adduction  isometric with ball  5 secs x10    TrA /PFM with bridges x10          SLR  x10    SL hip abd x10    TrA /PFM with bridges x10     Prone hip extension x10 SLR  x10 x2    SL hip abd x10 x2    TrA /PFM with bridges x10     Prone hip extension x10x2 Standing hip exercises RTB 2x10 on foam    Shuttle BLE 6 bands 2x10    Shuttle  R/L LE 4 bands 2x10 Lateral walks RTB x2 rounds  Monster/retro walks RTB x 2 rounds    Squats with hip abd RTB  Shuttle BLE 6 bands 2x10 R/L LE   Inclined stretches: gastrocs and soleus 30 x 3    Lateral walks RTB x2 rounds  Monster/retro walks RTB x 2 rounds    Shuttle R/L LE 5 bands 2x10 Shuttle BLE 8 band 2x10    Shuttle R/L LE 6 bands 2x10   Supine bridges  Single leg 2x10  Swiss ball squats 2x10    Manual:  Informed consent for internal pelvic evaluation given: Yes    PFM :   MFR/internal vaginal work  Layers 1-3 focused on              NMRed:  see below            INTERNAL RELAXATION :intervaginal       Diaphragmatic breathing x5 reps    Diaphragmatic breathing with PFM coordination x5     facilitation  PFM long hold x 3 secs x5  PFM quick contraction   PFM with precontractions with  cough x3 reps   TRA facilitation x3 reps           EXTERNAL Diaphragmatic breathing with PFM coordination x 2    PFM long hold x 3 secs x 2  reps : supine     TrA facilitation 5 secs x 3 reps  TrA with PF x 3  reps      Diaphragmatic breathing x 2 reps then on SB x2    Diaphragmatic breathing with PFM coordination x 2 in supine and on SB x2    PFM long hold x 10 secs x 2  reps : supine; SL then   seated on swiss ball     PFM quick contraction x 1- 2secs hold x10; supine / seated on SB       TrA facilitation 5 secs x 2 reps  TrA with PF x 2  reps    Supine and standing     PRECONTRACTIONS and PFM facilitation/posture awareness with cues for BIOMECHANICAL STRATEGIES     precontractions of PFM with cough x2; supine      sit to stand x 5 reps    DB x PFM  PFM long hold x 10 secs x 5  reps :  supine    PRECONTRACTIONS and PFM facilitation/posture awareness with cues for BIOMECHANICAL STRATEGIES     precontractions of PFM with cough x2; supine   ball throws to trampoline 4# 2x10    Step ups 6\" 2x10    TRA DB x PFM  PFM long hold x 10 secs x 3 reps : standing  PF quick contractions x10 reps in standing    ball throws to trampoline 4# 2x10  BLE     Swiss ball squats x10  SLS on R?L LE 5 secx x5 Diaphragmatic breathing x 2 reps    Diaphragmatic breathing with PFM coordination x 2    PFM long hold x 10 secs x 2  reps : supine     PFM quick contraction x 1- 2secs hold x10; supine      ascending motions x3 : supine /standing       PRECONTRACTIONS and PFM facilitation/posture awareness with cues for BIOMECHANICAL STRATEGIES    Low pull 5# 2x10    Shuttle gentle plyo jump 2x10 x3 bands  then single leg     Hip hinges 2x10 Swiss ball squats 2x10 with hip abd with RTB   Then sustained squats with 4# ball 30 secs x 2    PRECONTRACTIONS and PFM facilitation/posture awareness with cues for BIOMECHANICAL STRATEGIES      Shuttle gentle plyo jump 2x10 x3 bands then high knees x2 bands 30 secs x2    Bosu lunges forward and sideways    Straddle steps 4\" 30  secs x2     Postural corrections:    Scapula squuezes x10  Pizza carry x10     scapula retractions 2x10 YTB narrow and extension    Pinky on the wall x10    High knees 30 secs x1    Skaters x 30 secs   Squat jumps x5    Bosu lunges x2x10 sideways       Quadruped   Modified planks  30 x2    Quadruped chest taps 2x10  Quadruped hip extension 2x10    B ER RTB  2x10    Scapula walks x10 diagonals and horizontal    Trampoline activities:     High knees 30 secs x2    Bosu lunges x2x10 sideways    SLS with isometric knee flexion with small ball on contralateral leg while movements done towards hip extension x10 then towards hip abd x10  SLS with isometric knee flexion with small ball on contralateral leg while movements done towards hip extension x10 then towards hip abd x10      Ball throws to trampoline 2# 2x10 single leg    Quadruped   Modified planks  30 x2    Quadruped chest taps 2x10    Quadruped hip extension 2x10    Quadruped with opposite arm /leg lift 2x10    Squat jumps 2x10    High knees 30 secs x2    Bosu Lateral lunges 2x20 R/LLE    Swiss ball bridges 2x10      Swiss ball NWB 2x10   Education: PT provided/reviewed education on coordination of diaphragmatic breathing and pelvic floor contraction  and knack/pelvic muscle brace with hand outs given            HEP See pt instructions tab for new HEP today  See tab and PFM contractions See pt instructions tab for new HEP today    See tab See pt instructions tab for new HEP today See pt instructions tab for new HEP today See pt instructions tab for new HEP today            Charges: neuro lorenzo x 3 (45 mins)       Total Timed Treatment: 45 min  Total Treatment Time: 45 min

## 2024-06-07 ENCOUNTER — OFFICE VISIT (OUTPATIENT)
Dept: PHYSICAL THERAPY | Age: 44
End: 2024-06-07
Attending: OBSTETRICS & GYNECOLOGY
Payer: COMMERCIAL

## 2024-06-07 PROCEDURE — 97110 THERAPEUTIC EXERCISES: CPT

## 2025-04-30 ENCOUNTER — PATIENT MESSAGE (OUTPATIENT)
Dept: OBGYN CLINIC | Facility: CLINIC | Age: 45
End: 2025-04-30

## 2025-05-01 NOTE — TELEPHONE ENCOUNTER
Socorro is on Vienva, experiencing low libido.   She is asking if there may be a better fit for birth control.   She states she is mostly on Oral contraceptive due to abnormal uterine bleeding and issues with cysts/cramping.  has vasectomy. She is not interested in IUD.     She is overdue for annual. Last annual was 8/31/23, last problem visit 3/7/24.   Directed to schedule annual.   To Dr. Quesada to advise if willing to change ocp prior to annual exam.

## 2025-05-05 RX ORDER — LEVONORGESTREL/ETHIN.ESTRADIOL 0.1-0.02MG
1 TABLET ORAL DAILY
Qty: 28 TABLET | Refills: 1 | Status: SHIPPED | OUTPATIENT
Start: 2025-05-05

## 2025-05-05 NOTE — TELEPHONE ENCOUNTER
Please refill current OCP to annual and then we can discuss at her annual different BC to find the best fit for her.

## 2025-05-06 RX ORDER — TIMOLOL MALEATE 5 MG/ML
1 SOLUTION/ DROPS OPHTHALMIC DAILY
Qty: 84 TABLET | Refills: 0 | OUTPATIENT
Start: 2025-05-06

## 2025-07-03 ENCOUNTER — OFFICE VISIT (OUTPATIENT)
Dept: OBGYN CLINIC | Facility: CLINIC | Age: 45
End: 2025-07-03
Payer: COMMERCIAL

## 2025-07-03 VITALS
BODY MASS INDEX: 24.2 KG/M2 | DIASTOLIC BLOOD PRESSURE: 81 MMHG | HEIGHT: 67.5 IN | HEART RATE: 86 BPM | SYSTOLIC BLOOD PRESSURE: 115 MMHG | WEIGHT: 156 LBS

## 2025-07-03 DIAGNOSIS — Z30.41 ORAL CONTRACEPTIVE PILL SURVEILLANCE: ICD-10-CM

## 2025-07-03 DIAGNOSIS — Z01.419 WELL WOMAN EXAM WITH ROUTINE GYNECOLOGICAL EXAM: Primary | ICD-10-CM

## 2025-07-03 PROCEDURE — 3074F SYST BP LT 130 MM HG: CPT | Performed by: NURSE PRACTITIONER

## 2025-07-03 PROCEDURE — 3079F DIAST BP 80-89 MM HG: CPT | Performed by: NURSE PRACTITIONER

## 2025-07-03 PROCEDURE — 3008F BODY MASS INDEX DOCD: CPT | Performed by: NURSE PRACTITIONER

## 2025-07-03 PROCEDURE — 99396 PREV VISIT EST AGE 40-64: CPT | Performed by: NURSE PRACTITIONER

## 2025-07-03 RX ORDER — LEVONORGESTREL/ETHIN.ESTRADIOL 0.1-0.02MG
1 TABLET ORAL DAILY
Qty: 84 TABLET | Refills: 4 | Status: SHIPPED | OUTPATIENT
Start: 2025-07-03

## 2025-07-03 NOTE — PROGRESS NOTES
The following individual(s) verbally consented to be recorded using ambient AI listening technology and understand that they can each withdraw their consent to this listening technology at any point by asking the clinician to turn off or pause the recording:    Patient name: Socorro Gomez Daljit  Additional names:

## 2025-07-03 NOTE — PROGRESS NOTES
[facility]    Obstetrics and Gynecology    Chief Complaint   Patient presents with    Gyn Exam     ANNUAL EXAM / BCP REFILL       Socorro Bocanegra is a 44 year old female  Patient's last menstrual period was 2025. presenting for annual gynecology exam. Last seen 3/2024 with Dr. Quesada for endometrial biopsy for aub. She started birth control after that visit for period management and cysts  History of Present Illness  Since on ocps her menstrual cycle has improved, with reduced duration and clotting.    She reports a significant decrease in libido since starting birth control and wondering about other options.    She completed Pelvic floor therapy  last year which improved incontinence issues.  Hx of cold sores, uses abreeva    Pap:2023 NILM, negative human papillomavirus  Hx of leep  - normal since leep     Contraception:vasectomy  Mammo: 2024 normal at [facility]  Colonoscopy: never - has referral  Dexa: n/a    OBSTETRICS HISTORY:  OB History    Para Term  AB Living   2 2 2 0 0 2   SAB IAB Ectopic Multiple Live Births   0 0 0 0 2       GYNE HISTORY:  Menarche: 11 (7/3/2025  9:20 AM)  Period Cycle (Days): MONTHLY (7/3/2025  9:20 AM)  Period Duration (Days): 3 TO 5 DAYS (7/3/2025  9:20 AM)  Period Flow: MODERATE (7/3/2025  9:20 AM)  Use of Birth Control (if yes, specify type): Vasectomy (7/3/2025  9:20 AM)  Date When Birth Control Last Used: OCP (7/3/2025  9:20 AM)  Pap Date: 23 (7/3/2025  9:20 AM)  Pap Result Notes: NEG PAP / NEG HPV (7/3/2025  9:20 AM)      History   Sexual Activity    Sexual activity: Yes    Partners: Male    Birth control/ protection: Vasectomy           Latest Ref Rng & Units 2023     3:41 PM 2018     7:40 PM   RECENT PAP RESULTS   INTERPRETATION/RESULT:  Cytology Results: Negative for intraepithelial lesion or malignancy.  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; spouse negative    Herpes simplex    oral     History of abnormal cervical Pap smear    History of blood transfusion    Human papilloma virus infection    Infertility, female    Sinusitis    Recurrent sinus infections     Past Surgical History:   Procedure Laterality Date    Hysteroscopy      Leep      Other surgical history  11/2013    periacetabular osteotomy right hip    Periacetabular osteotomy Right 2013       SOCIAL HISTORY:  Social History     Socioeconomic History    Marital status:      Spouse name: Not on file    Number of children: Not on file    Years of education: Not on file    Highest education level: Not on file   Occupational History    Not on file   Tobacco Use    Smoking status: Never    Smokeless tobacco: Never   Vaping Use    Vaping status: Never Used   Substance and Sexual Activity    Alcohol use: Yes     Comment: SOCIALLY    Drug use: No    Sexual activity: Yes     Partners: Male     Birth control/protection: Vasectomy   Other Topics Concern    Not on file   Social History Narrative    Not on file     Social Drivers of Health     Food Insecurity: Low Risk  (7/12/2023)    Received from [facility]    Food Insecurity     Have there been times that your food ran out, and you didn't have money to get more?: No     Are there times that you worry that this might happen?: No   Transportation Needs: Low Risk  (7/12/2023)    Received from [facility]    Transportation Needs     Do you have trouble getting transportation to medical appointments?: No     How do you normally get to and from your appointments?: Not on file   Stress: Not on file   Housing Stability: Low Risk  (7/12/2023)    Received from [facility]    Housing Stability     Are you concerned about having a safe and reliable  place to live?: No         Depression Screening (PHQ-2/PHQ-9): Over the LAST 2 WEEKS   Little interest or pleasure in doing things (over the last two weeks)?: Not at all    Feeling down, depressed, or hopeless (over the last two weeks)?: Not at all    PHQ-2 SCORE: 0           FAMILY HISTORY:  Family History   Problem Relation Age of Onset    Hypertension Father     Hypertension Mother     Diabetes Maternal Grandmother     Cancer Maternal Grandfather         Glioblastoma    Heart Disease Paternal Grandfather     Hypertension Brother        MEDICATIONS:    Current Outpatient Medications:     Levonorgestrel-Ethinyl Estrad 0.1-20 MG-MCG Oral Tab, Take 1 tablet by mouth daily., Disp: 84 tablet, Rfl: 4    magnesium 250 MG Oral Tab, Take 1 tablet (250 mg total) by mouth., Disp: , Rfl:     albuterol 108 (90 Base) MCG/ACT Inhalation Aero Soln, Inhale 2 puffs into the lungs every 4 (four) hours as needed., Disp: , Rfl:     Ergocalciferol (VITAMIN D OR), Take by mouth., Disp: , Rfl:     Docosahexaenoic Acid (DHA OMEGA 3 OR), Take by mouth., Disp: , Rfl:     Probiotic Product (PROBIOTIC-10 OR), Take by mouth., Disp: , Rfl:     ketoconazole 2 % External Shampoo, Apply to scalp in shower and let sit for 5 minutes. Repeat every other day (Patient not taking: Reported on 7/3/2025), Disp: , Rfl:     ALLERGIES:    Allergies   Allergen Reactions    Cat Hair Extract HIVES and ITCHING         Review of Systems:  Constitutional:  Denies fatigue, night sweats, hot flashes  Eyes:  denies blurred or double vision  Cardiovascular:  denies chest pain or palpitations  Respiratory:  denies shortness of breath  Gastrointestinal:  denies heartburn, abdominal pain, diarrhea or constipation  Genitourinary:  denies dysuria, incontinence, abnormal vaginal discharge, vaginal itching,   Musculoskeletal:  denies back pain   Skin/Breast:  Denies any breast pain, lumps, or discharge.   Neurological:  denies headaches, extremity weakness or  numbness.  Psychiatric: denies depression or anxiety.  Endocrine:   denies excessive thirst or urination.  Heme/Lymph:  denies history of anemia, easy bruising or bleeding.      PHYSICAL EXAM:     Vitals:    07/03/25 0920   BP: 115/81   Pulse: 86   Weight: 156 lb (70.8 kg)   Height: 5' 7.5\" (1.715 m)       Body mass index is 24.07 kg/m².     Patient offered chaperone, patient declined  Constitutional: well developed, well nourished  Psychiatric:  Oriented to time, place, person and situation. Appropriate mood and affect  Head/Face: normocephalic  Neck/Thyroid: thyroid symmetric, no thyromegaly, no nodules, no adenopathy  Lymphatic:no abnormal supraclavicular or axillary adenopathy is noted  Breast: normal without palpable masses, tenderness, asymmetry, nipple discharge, nipple retraction or skin changes  Abdomen:  soft, nontender, nondistended, no masses  Skin/Hair: no unusual rashes or bruises  Extremities: no edema, no cyanosis    Pelvic Exam:  External Genitalia: normal appearance, hair distribution, and no lesions  Urethral Meatus:  normal in size, location, without lesions and prolapse  Bladder:  No fullness, masses or tenderness  Vagina:  Normal appearance without lesions, no abnormal discharge  Cervix:  Normal without tenderness on motion  Uterus: normal in size, contour, position, mobility, without tenderness  Adnexa: normal without masses or tenderness  Perineum: normal  Anus: no hemorroids     Assessment & Plan:    ICD-10-CM    1. Well woman exam with routine gynecological exam  Z01.419       2. Oral contraceptive pill surveillance  Z30.41          Assessment & Plan  Heavy periods and ovarian cysts/ocp surveillance  Doing well on ocps. Desires to continued.  Did discuss that ocps can cause decreased libido, Discussed Mirena IUD but would not help prevent cysts.  Reviewed risks benefits and se with ocps    General Health Maintenance  Due for mammogram and overdue colonoscopy. Normal Pap smears post-LEEP.    - Schedule mammogram in November.  - Encourage completion of overdue colonoscopy.  - Plan for Pap smear next year.  - Encourage regular exercise as feasible.    Discussed diet, exercise, MVIs with Ca/Vit D  Follow up in 1 yr for RANI Garcia      This note was prepared using Dragon Medical voice recognition dictation software. As a result errors may occur. When identified these errors have been corrected. While every attempt is made to correct errors during dictation discrepancies may still exist.

## 2025-07-14 NOTE — TELEPHONE ENCOUNTER
Office Note     Name: Sayda Tinsley    : 1994     MRN: [id number]     Chief Complaint  Annual Exam (Physical with pap)    Subjective     History of Present Illness:  Sayda Tinsley is a 31 y.o. female who presents today for chronic conditions    High blood pressure: history of gestational HTN, not on medications, at home usually 120/70s  Anxiety: occasional sleep disturbance, random thoughts  Weight gain: was previously on phentermine, would like something to help weight gain  LILY was previously on iron , currently not taking any     Pap smear  showed benign changes HR HPV positive    Plantar pain more on right heel pain, tried shoe inserts    Family History:   Family History   Problem Relation Age of Onset    Anxiety disorder Mother     Coronary artery disease Father     Hypertension Father     Diabetes Sister         Pre-Diabetic    Diabetes Maternal Grandfather     Cancer Maternal Grandfather     Melanoma Paternal Grandmother     Osteoporosis Paternal Grandmother     Hypertension Paternal Grandmother     Arthritis Paternal Grandmother        Social History:   Social History     Socioeconomic History    Marital status:      Spouse name: aman    Number of children: 1    Highest education level: High school graduate   Tobacco Use    Smoking status: Never     Passive exposure: Past    Smokeless tobacco: Never    Tobacco comments:     Not sure on start date   Vaping Use    Vaping status: Never Used   Substance and Sexual Activity    Alcohol use: Yes     Comment: Only social events    Drug use: Never    Sexual activity: Yes     Partners: Male     Birth control/protection: Condom       Health Maintenance   Topic Date Due    ANNUAL PHYSICAL  2025    COVID-19 Vaccine (2024- season) 2026 (Originally 2024)    INFLUENZA VACCINE  10/01/2025    PAP SMEAR  2027    TDAP/TD VACCINES (3 - Td or Tdap) 2033    HEPATITIS C SCREENING  Completed    Pneumococcal Vaccine 0-49  Aged  Pt is sched to come in today 12/13/22 at 10:50am, for post partum, but the pt started her period this morning, so she is not sure if she will need to resched or still keep todays appt? "Out       Patient Care Team:  Shanna Holder MD as PCP - General (Family Medicine)    Objective     Vital Signs  /89   Pulse 88   Temp 98.9 °F (37.2 °C) (Infrared)   Resp 16   Ht 165.1 cm (65\")   Wt 98 kg (216 lb)   SpO2 100%   BMI 35.94 kg/m²   Estimated body mass index is 35.94 kg/m² as calculated from the following:    Height as of this encounter: 165.1 cm (65\").    Weight as of this encounter: 98 kg (216 lb).        Physical Exam  Vitals and nursing note reviewed. Exam conducted with a chaperone present.   Constitutional:       Appearance: Normal appearance.   HENT:      Head: Normocephalic and atraumatic.   Cardiovascular:      Rate and Rhythm: Normal rate and regular rhythm.      Pulses: Normal pulses.      Heart sounds: Normal heart sounds.   Pulmonary:      Effort: Pulmonary effort is normal. No respiratory distress.      Breath sounds: Normal breath sounds. No wheezing, rhonchi or rales.   Abdominal:      General: Abdomen is flat. Bowel sounds are normal.      Palpations: Abdomen is soft.      Tenderness: There is no abdominal tenderness. There is no guarding.   Genitourinary:     General: Normal vulva.      Vagina: No vaginal discharge.      Comments: No cervical polyps or mass  Musculoskeletal:      Cervical back: Neck supple.   Skin:     General: Skin is warm.      Capillary Refill: Capillary refill takes less than 2 seconds.   Neurological:      General: No focal deficit present.      Mental Status: She is alert. Mental status is at baseline.   Psychiatric:         Mood and Affect: Mood normal.         Behavior: Behavior normal.          Procedures     Assessment and Plan     Diagnoses and all orders for this visit:    1. White coat syndrome without diagnosis of hypertension (Primary)  Assessment & Plan:  Stable, asymptomatic  Continue to monitor at home  Call clinic if with concerns      2. Annual physical exam  -     LIQUID-BASED PAP SMEAR WITH HPV GENOTYPING REGARDLESS OF " INTERPRETATION (SHANTANU,COR,MAD)  -     CBC (No Diff)  -     Basic Metabolic Panel  -     Lipid Panel  -     TSH Rfx On Abnormal To Free T4    3. Iron deficiency anemia secondary to inadequate dietary iron intake  Assessment & Plan:  Have labs done, not on medications at this time      4. Cervical cancer screening  -     LIQUID-BASED PAP SMEAR WITH HPV GENOTYPING REGARDLESS OF INTERPRETATION (SHANTANU,COR,MAD)    5. Plantar fasciitis  Assessment & Plan:  New problem  Refer to podiatry  Wear foot inserts and better supportive shoes    Orders:  -     Ambulatory Referral to Podiatry    6. Lumbar disc disease  Assessment & Plan:  XR showed mild disc disease  May continue prn mobic 15mg daily  Completed physical therapy  Performing back exercises at home  Call clinic if with concerns    Orders:  -     meloxicam (MOBIC) 15 MG tablet; Take 1 tablet by mouth Daily.  Dispense: 90 tablet; Refill: 0         Counseling was given to patient for the following topics: instructions for management, impressions, and risks and benefits of treatment options.    Follow Up  Return in about 6 months (around 1/14/2026) for 6 month follow up.    MD ELADIO Hodges [facility]  [address] [place] [id number] [phone]

## (undated) DIAGNOSIS — R73.09 ELEVATED GLUCOSE TOLERANCE TEST: Primary | ICD-10-CM

## (undated) NOTE — LETTER
VACCINE ADMINISTRATION RECORD  PARENT / GUARDIAN APPROVAL  Date: 2022  Vaccine administered to: Ben Drummond     : 1980    MRN: [id number]    A copy of the appropriate Centers for Disease Control and Prevention Vaccine Information statement has been provided. I have read or have had explained the information about the diseases and the vaccines listed below. There was an opportunity to ask questions and any questions were answered satisfactorily. I believe that I understand the benefits and risks of the vaccine cited and ask that the vaccine(s) listed below be given to me or to the person named above (for whom I am authorized to make this request). VACCINES ADMINISTERED:  TDAP    I have read and hereby agree to be bound by the terms of this agreement as stated above. My signature is valid until revoked by me in writing. This document is signed by SELF, relationship: SELF on 2022.:                                                                                                                                         Parent / Guardian Signature                                                Date    Nithin Matthews served as a witness to authentication that the identity of the person signing electronically is in fact the person represented as signing.

## (undated) NOTE — LETTER
AUTHORIZATION FOR SURGICAL OPERATION OR OTHER PROCEDURE    1. I hereby authorize Dr. Melanie Quesada, and [facility] staff assigned to my case to perform the following operation and/or procedure at the [facility] site:    _______________________________________________________________________________________________    Endometrial Biopsy  _______________________________________________________________________________________________    2.  My physician has explained the nature and purpose of the operation or other procedure, possible alternative methods of treatment, the risks involved, and the possibility of complication to me.  I acknowledge that no guarantee has been made as to the result that may be obtained.  3.  I recognize that, during the course of this operation, or other procedure, unforseen conditions may necessitate additional or different procedure than those listed above.  I, therefore, further authorize and request that the above named physician, his/her physician assistants or designees perform such procedures as are, in his/her professional opinion, necessary and desirable.  4.  Any tissue or organs removed in the operation or other procedure may be disposed of by and at the discretion of the [facility] and [facility].  5.  I understand that in the event of a medical emergency, I will be transported by local paramedics to [facility] or other hospital emergency department.  6.  I certify that I have read and fully understand the above consent to operation and/or other procedure.    7.  I acknowledge that my physician has explained sedation/analgesia administration to me including the risks and benefits.  I consent to the administration of sedation/analgesia as may be necessary or desirable in the judgement of my physician.    Witness signature: ___________________________________________________ Date:  ______/______/_____                     Time:  ________ A.M.  P.M.       Patient Name:  ______________________________________________________  (please print)      Patient signature:  ___________________________________________________             Relationship to Patient:           []  Parent    Responsible person                          []  Spouse  In case of minor or                    [] Other  _____________   Incompetent name:  __________________________________________________                               (please print)      _____________      Responsible person  In case of minor or  Incompetent signature:  _______________________________________________    Statement of Physician  My signature below affirms that prior to the time of the procedure, I have explained to the patient and/or his/her guardian, the risks and benefits involved in the proposed treatment and any reasonable alternative to the proposed treatment.  I have also explained the risks and benefits involved in the refusal of the proposed treatment and have answered the patient's questions.                        Date:  ______/______/_______  Provider                      Signature:  __________________________________________________________       Time:  ___________ A.M    P.M.